# Patient Record
Sex: MALE | Race: WHITE | HISPANIC OR LATINO | Employment: FULL TIME | URBAN - METROPOLITAN AREA
[De-identification: names, ages, dates, MRNs, and addresses within clinical notes are randomized per-mention and may not be internally consistent; named-entity substitution may affect disease eponyms.]

---

## 2022-06-15 ENCOUNTER — APPOINTMENT (OUTPATIENT)
Dept: RADIOLOGY | Facility: MEDICAL CENTER | Age: 62
End: 2022-06-15
Attending: PSYCHIATRY & NEUROLOGY
Payer: COMMERCIAL

## 2022-06-15 ENCOUNTER — APPOINTMENT (OUTPATIENT)
Dept: RADIOLOGY | Facility: MEDICAL CENTER | Age: 62
End: 2022-06-15
Attending: EMERGENCY MEDICINE
Payer: COMMERCIAL

## 2022-06-15 ENCOUNTER — APPOINTMENT (OUTPATIENT)
Dept: RADIOLOGY | Facility: MEDICAL CENTER | Age: 62
End: 2022-06-15
Attending: STUDENT IN AN ORGANIZED HEALTH CARE EDUCATION/TRAINING PROGRAM
Payer: COMMERCIAL

## 2022-06-15 ENCOUNTER — HOSPITAL ENCOUNTER (OUTPATIENT)
Facility: MEDICAL CENTER | Age: 62
End: 2022-06-17
Attending: EMERGENCY MEDICINE | Admitting: STUDENT IN AN ORGANIZED HEALTH CARE EDUCATION/TRAINING PROGRAM
Payer: COMMERCIAL

## 2022-06-15 DIAGNOSIS — R29.810 FACIAL WEAKNESS: ICD-10-CM

## 2022-06-15 DIAGNOSIS — E87.6 HYPOKALEMIA: ICD-10-CM

## 2022-06-15 DIAGNOSIS — I10 HYPERTENSION, UNSPECIFIED TYPE: ICD-10-CM

## 2022-06-15 DIAGNOSIS — R03.0 ELEVATED BLOOD PRESSURE READING: ICD-10-CM

## 2022-06-15 DIAGNOSIS — I63.81 BASAL GANGLIA STROKE (HCC): ICD-10-CM

## 2022-06-15 DIAGNOSIS — R47.81 SLURRED SPEECH: ICD-10-CM

## 2022-06-15 PROBLEM — I16.0 HYPERTENSIVE URGENCY: Status: ACTIVE | Noted: 2022-06-15

## 2022-06-15 PROBLEM — R79.89 ELEVATED LFTS: Status: ACTIVE | Noted: 2022-06-15

## 2022-06-15 PROBLEM — R73.9 HYPERGLYCEMIA: Status: ACTIVE | Noted: 2022-06-15

## 2022-06-15 PROBLEM — N17.9 AKI (ACUTE KIDNEY INJURY) (HCC): Status: ACTIVE | Noted: 2022-06-15

## 2022-06-15 LAB
ABO + RH BLD: NORMAL
ABO GROUP BLD: NORMAL
ALBUMIN SERPL BCP-MCNC: 4.5 G/DL (ref 3.2–4.9)
ALBUMIN/GLOB SERPL: 1.5 G/DL
ALP SERPL-CCNC: 98 U/L (ref 30–99)
ALT SERPL-CCNC: 51 U/L (ref 2–50)
AMPHET UR QL SCN: NEGATIVE
ANION GAP SERPL CALC-SCNC: 15 MMOL/L (ref 7–16)
APTT PPP: 25.6 SEC (ref 24.7–36)
AST SERPL-CCNC: 49 U/L (ref 12–45)
BARBITURATES UR QL SCN: NEGATIVE
BASOPHILS # BLD AUTO: 1.4 % (ref 0–1.8)
BASOPHILS # BLD: 0.1 K/UL (ref 0–0.12)
BENZODIAZ UR QL SCN: NEGATIVE
BILIRUB SERPL-MCNC: 1.5 MG/DL (ref 0.1–1.5)
BLD GP AB SCN SERPL QL: NORMAL
BUN SERPL-MCNC: 12 MG/DL (ref 8–22)
BZE UR QL SCN: NEGATIVE
CALCIUM SERPL-MCNC: 9.5 MG/DL (ref 8.5–10.5)
CANNABINOIDS UR QL SCN: NEGATIVE
CHLORIDE SERPL-SCNC: 107 MMOL/L (ref 96–112)
CO2 SERPL-SCNC: 22 MMOL/L (ref 20–33)
CREAT SERPL-MCNC: 1.33 MG/DL (ref 0.5–1.4)
EOSINOPHIL # BLD AUTO: 0.13 K/UL (ref 0–0.51)
EOSINOPHIL NFR BLD: 1.8 % (ref 0–6.9)
ERYTHROCYTE [DISTWIDTH] IN BLOOD BY AUTOMATED COUNT: 38.4 FL (ref 35.9–50)
EST. AVERAGE GLUCOSE BLD GHB EST-MCNC: 111 MG/DL
ETHANOL BLD-MCNC: <10.1 MG/DL
GFR SERPLBLD CREATININE-BSD FMLA CKD-EPI: 61 ML/MIN/1.73 M 2
GLOBULIN SER CALC-MCNC: 3 G/DL (ref 1.9–3.5)
GLUCOSE BLD STRIP.AUTO-MCNC: 125 MG/DL (ref 65–99)
GLUCOSE SERPL-MCNC: 159 MG/DL (ref 65–99)
HBA1C MFR BLD: 5.5 % (ref 4–5.6)
HCT VFR BLD AUTO: 48.3 % (ref 42–52)
HGB BLD-MCNC: 17.9 G/DL (ref 14–18)
IMM GRANULOCYTES # BLD AUTO: 0.03 K/UL (ref 0–0.11)
IMM GRANULOCYTES NFR BLD AUTO: 0.4 % (ref 0–0.9)
INR PPP: 1.07 (ref 0.87–1.13)
LYMPHOCYTES # BLD AUTO: 1.78 K/UL (ref 1–4.8)
LYMPHOCYTES NFR BLD: 24.4 % (ref 22–41)
MCH RBC QN AUTO: 31.2 PG (ref 27–33)
MCHC RBC AUTO-ENTMCNC: 37.1 G/DL (ref 33.7–35.3)
MCV RBC AUTO: 84.3 FL (ref 81.4–97.8)
METHADONE UR QL SCN: NEGATIVE
MONOCYTES # BLD AUTO: 0.75 K/UL (ref 0–0.85)
MONOCYTES NFR BLD AUTO: 10.3 % (ref 0–13.4)
NEUTROPHILS # BLD AUTO: 4.52 K/UL (ref 1.82–7.42)
NEUTROPHILS NFR BLD: 61.7 % (ref 44–72)
NRBC # BLD AUTO: 0 K/UL
NRBC BLD-RTO: 0 /100 WBC
OPIATES UR QL SCN: NEGATIVE
OXYCODONE UR QL SCN: NEGATIVE
PCP UR QL SCN: NEGATIVE
PLATELET # BLD AUTO: 75 K/UL (ref 164–446)
PMV BLD AUTO: 12.2 FL (ref 9–12.9)
POTASSIUM SERPL-SCNC: 3.3 MMOL/L (ref 3.6–5.5)
PROPOXYPH UR QL SCN: NEGATIVE
PROT SERPL-MCNC: 7.5 G/DL (ref 6–8.2)
PROTHROMBIN TIME: 13.6 SEC (ref 12–14.6)
RBC # BLD AUTO: 5.73 M/UL (ref 4.7–6.1)
RH BLD: NORMAL
SODIUM SERPL-SCNC: 144 MMOL/L (ref 135–145)
TROPONIN T SERPL-MCNC: 15 NG/L (ref 6–19)
WBC # BLD AUTO: 7.3 K/UL (ref 4.8–10.8)

## 2022-06-15 PROCEDURE — 80307 DRUG TEST PRSMV CHEM ANLYZR: CPT

## 2022-06-15 PROCEDURE — 86901 BLOOD TYPING SEROLOGIC RH(D): CPT

## 2022-06-15 PROCEDURE — 83036 HEMOGLOBIN GLYCOSYLATED A1C: CPT

## 2022-06-15 PROCEDURE — 76700 US EXAM ABDOM COMPLETE: CPT

## 2022-06-15 PROCEDURE — 70496 CT ANGIOGRAPHY HEAD: CPT

## 2022-06-15 PROCEDURE — G0378 HOSPITAL OBSERVATION PER HR: HCPCS

## 2022-06-15 PROCEDURE — A9576 INJ PROHANCE MULTIPACK: HCPCS | Performed by: PSYCHIATRY & NEUROLOGY

## 2022-06-15 PROCEDURE — 700111 HCHG RX REV CODE 636 W/ 250 OVERRIDE (IP): Performed by: EMERGENCY MEDICINE

## 2022-06-15 PROCEDURE — A9270 NON-COVERED ITEM OR SERVICE: HCPCS | Performed by: STUDENT IN AN ORGANIZED HEALTH CARE EDUCATION/TRAINING PROGRAM

## 2022-06-15 PROCEDURE — 86850 RBC ANTIBODY SCREEN: CPT

## 2022-06-15 PROCEDURE — 86900 BLOOD TYPING SEROLOGIC ABO: CPT

## 2022-06-15 PROCEDURE — 700111 HCHG RX REV CODE 636 W/ 250 OVERRIDE (IP): Performed by: STUDENT IN AN ORGANIZED HEALTH CARE EDUCATION/TRAINING PROGRAM

## 2022-06-15 PROCEDURE — 96374 THER/PROPH/DIAG INJ IV PUSH: CPT

## 2022-06-15 PROCEDURE — 96376 TX/PRO/DX INJ SAME DRUG ADON: CPT

## 2022-06-15 PROCEDURE — 700102 HCHG RX REV CODE 250 W/ 637 OVERRIDE(OP): Performed by: STUDENT IN AN ORGANIZED HEALTH CARE EDUCATION/TRAINING PROGRAM

## 2022-06-15 PROCEDURE — 99285 EMERGENCY DEPT VISIT HI MDM: CPT

## 2022-06-15 PROCEDURE — 93005 ELECTROCARDIOGRAM TRACING: CPT | Performed by: EMERGENCY MEDICINE

## 2022-06-15 PROCEDURE — 80053 COMPREHEN METABOLIC PANEL: CPT

## 2022-06-15 PROCEDURE — 99218 PR INITIAL OBSERVATION CARE,LEVL I: CPT | Performed by: STUDENT IN AN ORGANIZED HEALTH CARE EDUCATION/TRAINING PROGRAM

## 2022-06-15 PROCEDURE — 70450 CT HEAD/BRAIN W/O DYE: CPT

## 2022-06-15 PROCEDURE — 700101 HCHG RX REV CODE 250: Performed by: STUDENT IN AN ORGANIZED HEALTH CARE EDUCATION/TRAINING PROGRAM

## 2022-06-15 PROCEDURE — 96375 TX/PRO/DX INJ NEW DRUG ADDON: CPT

## 2022-06-15 PROCEDURE — 82962 GLUCOSE BLOOD TEST: CPT

## 2022-06-15 PROCEDURE — 36415 COLL VENOUS BLD VENIPUNCTURE: CPT

## 2022-06-15 PROCEDURE — 700117 HCHG RX CONTRAST REV CODE 255: Performed by: PSYCHIATRY & NEUROLOGY

## 2022-06-15 PROCEDURE — 85610 PROTHROMBIN TIME: CPT

## 2022-06-15 PROCEDURE — 85730 THROMBOPLASTIN TIME PARTIAL: CPT

## 2022-06-15 PROCEDURE — 94760 N-INVAS EAR/PLS OXIMETRY 1: CPT

## 2022-06-15 PROCEDURE — 700117 HCHG RX CONTRAST REV CODE 255: Performed by: EMERGENCY MEDICINE

## 2022-06-15 PROCEDURE — 84484 ASSAY OF TROPONIN QUANT: CPT

## 2022-06-15 PROCEDURE — 85025 COMPLETE CBC W/AUTO DIFF WBC: CPT

## 2022-06-15 PROCEDURE — 70553 MRI BRAIN STEM W/O & W/DYE: CPT

## 2022-06-15 PROCEDURE — 99204 OFFICE O/P NEW MOD 45 MIN: CPT | Performed by: PSYCHIATRY & NEUROLOGY

## 2022-06-15 PROCEDURE — 70498 CT ANGIOGRAPHY NECK: CPT

## 2022-06-15 PROCEDURE — 0042T CT-CEREBRAL PERFUSION ANALYSIS: CPT

## 2022-06-15 PROCEDURE — 71045 X-RAY EXAM CHEST 1 VIEW: CPT

## 2022-06-15 PROCEDURE — 82077 ASSAY SPEC XCP UR&BREATH IA: CPT

## 2022-06-15 RX ORDER — LABETALOL HYDROCHLORIDE 5 MG/ML
10 INJECTION, SOLUTION INTRAVENOUS ONCE
Status: COMPLETED | OUTPATIENT
Start: 2022-06-15 | End: 2022-06-15

## 2022-06-15 RX ORDER — SODIUM CHLORIDE AND POTASSIUM CHLORIDE 150; 900 MG/100ML; MG/100ML
INJECTION, SOLUTION INTRAVENOUS CONTINUOUS
Status: DISCONTINUED | OUTPATIENT
Start: 2022-06-15 | End: 2022-06-16

## 2022-06-15 RX ORDER — ASPIRIN 300 MG/1
300 SUPPOSITORY RECTAL EVERY EVENING
Status: DISCONTINUED | OUTPATIENT
Start: 2022-06-15 | End: 2022-06-16

## 2022-06-15 RX ORDER — PROCHLORPERAZINE EDISYLATE 5 MG/ML
5-10 INJECTION INTRAMUSCULAR; INTRAVENOUS EVERY 4 HOURS PRN
Status: DISCONTINUED | OUTPATIENT
Start: 2022-06-15 | End: 2022-06-17 | Stop reason: HOSPADM

## 2022-06-15 RX ORDER — HYDRALAZINE HYDROCHLORIDE 50 MG/1
50 TABLET, FILM COATED ORAL EVERY 8 HOURS
Status: DISCONTINUED | OUTPATIENT
Start: 2022-06-15 | End: 2022-06-17 | Stop reason: HOSPADM

## 2022-06-15 RX ORDER — SODIUM CHLORIDE AND POTASSIUM CHLORIDE 150; 900 MG/100ML; MG/100ML
INJECTION, SOLUTION INTRAVENOUS CONTINUOUS
Status: DISCONTINUED | OUTPATIENT
Start: 2022-06-15 | End: 2022-06-15

## 2022-06-15 RX ORDER — AMLODIPINE BESYLATE 10 MG/1
10 TABLET ORAL
Status: DISCONTINUED | OUTPATIENT
Start: 2022-06-15 | End: 2022-06-17 | Stop reason: HOSPADM

## 2022-06-15 RX ORDER — DEXTROSE MONOHYDRATE 25 G/50ML
25 INJECTION, SOLUTION INTRAVENOUS
Status: DISCONTINUED | OUTPATIENT
Start: 2022-06-15 | End: 2022-06-16

## 2022-06-15 RX ORDER — PROMETHAZINE HYDROCHLORIDE 25 MG/1
12.5-25 SUPPOSITORY RECTAL EVERY 4 HOURS PRN
Status: DISCONTINUED | OUTPATIENT
Start: 2022-06-15 | End: 2022-06-17 | Stop reason: HOSPADM

## 2022-06-15 RX ORDER — ONDANSETRON 2 MG/ML
4 INJECTION INTRAMUSCULAR; INTRAVENOUS EVERY 4 HOURS PRN
Status: DISCONTINUED | OUTPATIENT
Start: 2022-06-15 | End: 2022-06-16

## 2022-06-15 RX ORDER — ASPIRIN 325 MG
325 TABLET ORAL EVERY EVENING
Status: DISCONTINUED | OUTPATIENT
Start: 2022-06-15 | End: 2022-06-16

## 2022-06-15 RX ORDER — AMOXICILLIN 250 MG
2 CAPSULE ORAL 2 TIMES DAILY
Status: DISCONTINUED | OUTPATIENT
Start: 2022-06-16 | End: 2022-06-17 | Stop reason: HOSPADM

## 2022-06-15 RX ORDER — ATORVASTATIN CALCIUM 80 MG/1
80 TABLET, FILM COATED ORAL EVERY EVENING
Status: DISCONTINUED | OUTPATIENT
Start: 2022-06-15 | End: 2022-06-17 | Stop reason: HOSPADM

## 2022-06-15 RX ORDER — ONDANSETRON 4 MG/1
4 TABLET, ORALLY DISINTEGRATING ORAL EVERY 4 HOURS PRN
Status: DISCONTINUED | OUTPATIENT
Start: 2022-06-15 | End: 2022-06-16

## 2022-06-15 RX ORDER — PROMETHAZINE HYDROCHLORIDE 25 MG/1
12.5-25 TABLET ORAL EVERY 4 HOURS PRN
Status: DISCONTINUED | OUTPATIENT
Start: 2022-06-15 | End: 2022-06-17 | Stop reason: HOSPADM

## 2022-06-15 RX ORDER — BISACODYL 10 MG
10 SUPPOSITORY, RECTAL RECTAL
Status: DISCONTINUED | OUTPATIENT
Start: 2022-06-15 | End: 2022-06-17 | Stop reason: HOSPADM

## 2022-06-15 RX ORDER — POTASSIUM CHLORIDE 20 MEQ/1
40 TABLET, EXTENDED RELEASE ORAL ONCE
Status: ACTIVE | OUTPATIENT
Start: 2022-06-15 | End: 2022-06-16

## 2022-06-15 RX ORDER — ACETAMINOPHEN 325 MG/1
650 TABLET ORAL EVERY 6 HOURS PRN
Status: DISCONTINUED | OUTPATIENT
Start: 2022-06-15 | End: 2022-06-17 | Stop reason: HOSPADM

## 2022-06-15 RX ORDER — ASPIRIN 81 MG/1
324 TABLET, CHEWABLE ORAL EVERY EVENING
Status: DISCONTINUED | OUTPATIENT
Start: 2022-06-15 | End: 2022-06-16

## 2022-06-15 RX ORDER — HYDRALAZINE HYDROCHLORIDE 20 MG/ML
10 INJECTION INTRAMUSCULAR; INTRAVENOUS EVERY 4 HOURS PRN
Status: DISCONTINUED | OUTPATIENT
Start: 2022-06-15 | End: 2022-06-17 | Stop reason: HOSPADM

## 2022-06-15 RX ORDER — POLYETHYLENE GLYCOL 3350 17 G/17G
1 POWDER, FOR SOLUTION ORAL
Status: DISCONTINUED | OUTPATIENT
Start: 2022-06-15 | End: 2022-06-17 | Stop reason: HOSPADM

## 2022-06-15 RX ORDER — HYDRALAZINE HYDROCHLORIDE 20 MG/ML
20 INJECTION INTRAMUSCULAR; INTRAVENOUS ONCE
Status: COMPLETED | OUTPATIENT
Start: 2022-06-15 | End: 2022-06-15

## 2022-06-15 RX ADMIN — IOPAMIDOL 80 ML: 755 INJECTION, SOLUTION INTRAVENOUS at 17:23

## 2022-06-15 RX ADMIN — IOHEXOL 40 ML: 350 INJECTION, SOLUTION INTRAVENOUS at 17:45

## 2022-06-15 RX ADMIN — HYDRALAZINE HYDROCHLORIDE 20 MG: 20 INJECTION INTRAMUSCULAR; INTRAVENOUS at 17:56

## 2022-06-15 RX ADMIN — GADOTERIDOL 15 ML: 279.3 INJECTION, SOLUTION INTRAVENOUS at 22:01

## 2022-06-15 RX ADMIN — POTASSIUM CHLORIDE AND SODIUM CHLORIDE: 900; 150 INJECTION, SOLUTION INTRAVENOUS at 19:45

## 2022-06-15 RX ADMIN — LABETALOL HYDROCHLORIDE 10 MG: 5 INJECTION, SOLUTION INTRAVENOUS at 20:44

## 2022-06-15 RX ADMIN — ASPIRIN 300 MG: 300 SUPPOSITORY RECTAL at 20:47

## 2022-06-15 RX ADMIN — HYDRALAZINE HYDROCHLORIDE 10 MG: 20 INJECTION INTRAMUSCULAR; INTRAVENOUS at 23:06

## 2022-06-15 ASSESSMENT — ENCOUNTER SYMPTOMS
TREMORS: 0
DOUBLE VISION: 0
ABDOMINAL PAIN: 0
WHEEZING: 0
LOSS OF CONSCIOUSNESS: 0
TINGLING: 0
SPEECH CHANGE: 1
VOMITING: 0
WEAKNESS: 0
BLURRED VISION: 0
FOCAL WEAKNESS: 0
BRUISES/BLEEDS EASILY: 0
PALPITATIONS: 0
NAUSEA: 0
HEARTBURN: 0
PND: 0
HEADACHES: 0
SENSORY CHANGE: 0
FALLS: 0
COUGH: 0
CHILLS: 0
MYALGIAS: 0
DEPRESSION: 0
SEIZURES: 0
FEVER: 0
DIZZINESS: 0

## 2022-06-15 ASSESSMENT — LIFESTYLE VARIABLES
DOES PATIENT WANT TO STOP DRINKING: NO
TOTAL SCORE: 0
CONSUMPTION TOTAL: NEGATIVE
TOTAL SCORE: 0
EVER FELT BAD OR GUILTY ABOUT YOUR DRINKING: NO
EVER HAD A DRINK FIRST THING IN THE MORNING TO STEADY YOUR NERVES TO GET RID OF A HANGOVER: NO
SUBSTANCE_ABUSE: 0
HAVE YOU EVER FELT YOU SHOULD CUT DOWN ON YOUR DRINKING: NO
HOW MANY TIMES IN THE PAST YEAR HAVE YOU HAD 5 OR MORE DRINKS IN A DAY: 0
AVERAGE NUMBER OF DAYS PER WEEK YOU HAVE A DRINK CONTAINING ALCOHOL: 0
HAVE PEOPLE ANNOYED YOU BY CRITICIZING YOUR DRINKING: NO
ALCOHOL_USE: NO
TOTAL SCORE: 0
ON A TYPICAL DAY WHEN YOU DRINK ALCOHOL HOW MANY DRINKS DO YOU HAVE: 0

## 2022-06-15 ASSESSMENT — COGNITIVE AND FUNCTIONAL STATUS - GENERAL
MOVING FROM LYING ON BACK TO SITTING ON SIDE OF FLAT BED: A LITTLE
DAILY ACTIVITIY SCORE: 24
SUGGESTED CMS G CODE MODIFIER DAILY ACTIVITY: CH
MOBILITY SCORE: 20
CLIMB 3 TO 5 STEPS WITH RAILING: A LITTLE
SUGGESTED CMS G CODE MODIFIER MOBILITY: CJ
STANDING UP FROM CHAIR USING ARMS: A LITTLE
WALKING IN HOSPITAL ROOM: A LITTLE

## 2022-06-15 ASSESSMENT — PAIN DESCRIPTION - PAIN TYPE: TYPE: ACUTE PAIN

## 2022-06-15 ASSESSMENT — PATIENT HEALTH QUESTIONNAIRE - PHQ9
1. LITTLE INTEREST OR PLEASURE IN DOING THINGS: NOT AT ALL
SUM OF ALL RESPONSES TO PHQ9 QUESTIONS 1 AND 2: 0
2. FEELING DOWN, DEPRESSED, IRRITABLE, OR HOPELESS: NOT AT ALL

## 2022-06-15 ASSESSMENT — FIBROSIS 4 INDEX: FIB4 SCORE: 5.58

## 2022-06-16 PROBLEM — R94.31 QT PROLONGATION: Status: ACTIVE | Noted: 2022-06-16

## 2022-06-16 PROBLEM — I63.81 BASAL GANGLIA STROKE (HCC): Status: ACTIVE | Noted: 2022-06-16

## 2022-06-16 LAB
ALBUMIN SERPL BCP-MCNC: 4.1 G/DL (ref 3.2–4.9)
ALBUMIN/GLOB SERPL: 1.5 G/DL
ALP SERPL-CCNC: 90 U/L (ref 30–99)
ALT SERPL-CCNC: 47 U/L (ref 2–50)
ANION GAP SERPL CALC-SCNC: 13 MMOL/L (ref 7–16)
AST SERPL-CCNC: 36 U/L (ref 12–45)
BASOPHILS # BLD AUTO: 1.1 % (ref 0–1.8)
BASOPHILS # BLD: 0.08 K/UL (ref 0–0.12)
BILIRUB SERPL-MCNC: 1.4 MG/DL (ref 0.1–1.5)
BUN SERPL-MCNC: 12 MG/DL (ref 8–22)
CALCIUM SERPL-MCNC: 8.7 MG/DL (ref 8.5–10.5)
CHLORIDE SERPL-SCNC: 110 MMOL/L (ref 96–112)
CHOLEST SERPL-MCNC: 176 MG/DL (ref 100–199)
CO2 SERPL-SCNC: 21 MMOL/L (ref 20–33)
CREAT SERPL-MCNC: 1.2 MG/DL (ref 0.5–1.4)
CREAT UR-MCNC: 117.75 MG/DL
EKG IMPRESSION: NORMAL
EOSINOPHIL # BLD AUTO: 0.05 K/UL (ref 0–0.51)
EOSINOPHIL NFR BLD: 0.7 % (ref 0–6.9)
ERYTHROCYTE [DISTWIDTH] IN BLOOD BY AUTOMATED COUNT: 39.7 FL (ref 35.9–50)
GFR SERPLBLD CREATININE-BSD FMLA CKD-EPI: 69 ML/MIN/1.73 M 2
GLOBULIN SER CALC-MCNC: 2.8 G/DL (ref 1.9–3.5)
GLUCOSE BLD STRIP.AUTO-MCNC: 114 MG/DL (ref 65–99)
GLUCOSE BLD STRIP.AUTO-MCNC: 115 MG/DL (ref 65–99)
GLUCOSE BLD STRIP.AUTO-MCNC: 118 MG/DL (ref 65–99)
GLUCOSE BLD STRIP.AUTO-MCNC: 120 MG/DL (ref 65–99)
GLUCOSE SERPL-MCNC: 143 MG/DL (ref 65–99)
HCT VFR BLD AUTO: 46.7 % (ref 42–52)
HDLC SERPL-MCNC: 36 MG/DL
HGB BLD-MCNC: 16.9 G/DL (ref 14–18)
IMM GRANULOCYTES # BLD AUTO: 0.05 K/UL (ref 0–0.11)
IMM GRANULOCYTES NFR BLD AUTO: 0.7 % (ref 0–0.9)
LDLC SERPL CALC-MCNC: 119 MG/DL
LYMPHOCYTES # BLD AUTO: 1.41 K/UL (ref 1–4.8)
LYMPHOCYTES NFR BLD: 19.1 % (ref 22–41)
MAGNESIUM SERPL-MCNC: 2.1 MG/DL (ref 1.5–2.5)
MCH RBC QN AUTO: 30.5 PG (ref 27–33)
MCHC RBC AUTO-ENTMCNC: 36.2 G/DL (ref 33.7–35.3)
MCV RBC AUTO: 84.1 FL (ref 81.4–97.8)
MONOCYTES # BLD AUTO: 0.64 K/UL (ref 0–0.85)
MONOCYTES NFR BLD AUTO: 8.7 % (ref 0–13.4)
NEUTROPHILS # BLD AUTO: 5.14 K/UL (ref 1.82–7.42)
NEUTROPHILS NFR BLD: 69.7 % (ref 44–72)
NRBC # BLD AUTO: 0 K/UL
NRBC BLD-RTO: 0 /100 WBC
PHOSPHATE SERPL-MCNC: 3 MG/DL (ref 2.5–4.5)
PLATELET # BLD AUTO: 72 K/UL (ref 164–446)
PMV BLD AUTO: 12.7 FL (ref 9–12.9)
POTASSIUM SERPL-SCNC: 3.1 MMOL/L (ref 3.6–5.5)
PROT SERPL-MCNC: 6.9 G/DL (ref 6–8.2)
RBC # BLD AUTO: 5.55 M/UL (ref 4.7–6.1)
SODIUM SERPL-SCNC: 144 MMOL/L (ref 135–145)
SODIUM UR-SCNC: 122 MMOL/L
TRIGL SERPL-MCNC: 107 MG/DL (ref 0–149)
WBC # BLD AUTO: 7.4 K/UL (ref 4.8–10.8)

## 2022-06-16 PROCEDURE — 97162 PT EVAL MOD COMPLEX 30 MIN: CPT

## 2022-06-16 PROCEDURE — 82962 GLUCOSE BLOOD TEST: CPT | Mod: 91

## 2022-06-16 PROCEDURE — 80053 COMPREHEN METABOLIC PANEL: CPT

## 2022-06-16 PROCEDURE — 82570 ASSAY OF URINE CREATININE: CPT

## 2022-06-16 PROCEDURE — A9270 NON-COVERED ITEM OR SERVICE: HCPCS | Performed by: PSYCHIATRY & NEUROLOGY

## 2022-06-16 PROCEDURE — 96375 TX/PRO/DX INJ NEW DRUG ADDON: CPT

## 2022-06-16 PROCEDURE — 36415 COLL VENOUS BLD VENIPUNCTURE: CPT

## 2022-06-16 PROCEDURE — 97165 OT EVAL LOW COMPLEX 30 MIN: CPT

## 2022-06-16 PROCEDURE — 99226 PR SUBSEQUENT OBSERVATION CARE,LEVEL III: CPT | Performed by: STUDENT IN AN ORGANIZED HEALTH CARE EDUCATION/TRAINING PROGRAM

## 2022-06-16 PROCEDURE — 80061 LIPID PANEL: CPT

## 2022-06-16 PROCEDURE — 96372 THER/PROPH/DIAG INJ SC/IM: CPT

## 2022-06-16 PROCEDURE — 99214 OFFICE O/P EST MOD 30 MIN: CPT | Performed by: PSYCHIATRY & NEUROLOGY

## 2022-06-16 PROCEDURE — 700111 HCHG RX REV CODE 636 W/ 250 OVERRIDE (IP)

## 2022-06-16 PROCEDURE — 96366 THER/PROPH/DIAG IV INF ADDON: CPT

## 2022-06-16 PROCEDURE — A9270 NON-COVERED ITEM OR SERVICE: HCPCS | Performed by: STUDENT IN AN ORGANIZED HEALTH CARE EDUCATION/TRAINING PROGRAM

## 2022-06-16 PROCEDURE — 700102 HCHG RX REV CODE 250 W/ 637 OVERRIDE(OP): Performed by: PSYCHIATRY & NEUROLOGY

## 2022-06-16 PROCEDURE — 83735 ASSAY OF MAGNESIUM: CPT

## 2022-06-16 PROCEDURE — 700105 HCHG RX REV CODE 258: Performed by: STUDENT IN AN ORGANIZED HEALTH CARE EDUCATION/TRAINING PROGRAM

## 2022-06-16 PROCEDURE — 85025 COMPLETE CBC W/AUTO DIFF WBC: CPT

## 2022-06-16 PROCEDURE — 700111 HCHG RX REV CODE 636 W/ 250 OVERRIDE (IP): Performed by: STUDENT IN AN ORGANIZED HEALTH CARE EDUCATION/TRAINING PROGRAM

## 2022-06-16 PROCEDURE — 92610 EVALUATE SWALLOWING FUNCTION: CPT

## 2022-06-16 PROCEDURE — 700102 HCHG RX REV CODE 250 W/ 637 OVERRIDE(OP): Performed by: STUDENT IN AN ORGANIZED HEALTH CARE EDUCATION/TRAINING PROGRAM

## 2022-06-16 PROCEDURE — 84300 ASSAY OF URINE SODIUM: CPT

## 2022-06-16 PROCEDURE — 84100 ASSAY OF PHOSPHORUS: CPT

## 2022-06-16 PROCEDURE — G0378 HOSPITAL OBSERVATION PER HR: HCPCS

## 2022-06-16 PROCEDURE — 96365 THER/PROPH/DIAG IV INF INIT: CPT

## 2022-06-16 PROCEDURE — 97116 GAIT TRAINING THERAPY: CPT

## 2022-06-16 RX ORDER — DEXTROSE MONOHYDRATE 25 G/50ML
25 INJECTION, SOLUTION INTRAVENOUS
Status: DISCONTINUED | OUTPATIENT
Start: 2022-06-16 | End: 2022-06-17 | Stop reason: HOSPADM

## 2022-06-16 RX ORDER — KETOROLAC TROMETHAMINE 30 MG/ML
15 INJECTION, SOLUTION INTRAMUSCULAR; INTRAVENOUS EVERY 6 HOURS PRN
Status: DISCONTINUED | OUTPATIENT
Start: 2022-06-16 | End: 2022-06-17 | Stop reason: HOSPADM

## 2022-06-16 RX ORDER — CLOPIDOGREL BISULFATE 75 MG/1
75 TABLET ORAL DAILY
Status: DISCONTINUED | OUTPATIENT
Start: 2022-06-16 | End: 2022-06-17 | Stop reason: HOSPADM

## 2022-06-16 RX ORDER — POTASSIUM CHLORIDE 7.45 MG/ML
10 INJECTION INTRAVENOUS
Status: DISPENSED | OUTPATIENT
Start: 2022-06-16 | End: 2022-06-16

## 2022-06-16 RX ORDER — SODIUM CHLORIDE 9 MG/ML
INJECTION, SOLUTION INTRAVENOUS CONTINUOUS
Status: DISCONTINUED | OUTPATIENT
Start: 2022-06-16 | End: 2022-06-16

## 2022-06-16 RX ORDER — ENOXAPARIN SODIUM 100 MG/ML
40 INJECTION SUBCUTANEOUS DAILY
Status: DISCONTINUED | OUTPATIENT
Start: 2022-06-16 | End: 2022-06-17 | Stop reason: HOSPADM

## 2022-06-16 RX ORDER — POTASSIUM CHLORIDE 7.45 MG/ML
10 INJECTION INTRAVENOUS
Status: COMPLETED | OUTPATIENT
Start: 2022-06-16 | End: 2022-06-16

## 2022-06-16 RX ORDER — ASPIRIN 81 MG/1
81 TABLET, CHEWABLE ORAL DAILY
Status: DISCONTINUED | OUTPATIENT
Start: 2022-06-16 | End: 2022-06-17 | Stop reason: HOSPADM

## 2022-06-16 RX ADMIN — POTASSIUM CHLORIDE 10 MEQ: 7.46 INJECTION, SOLUTION INTRAVENOUS at 12:01

## 2022-06-16 RX ADMIN — SODIUM CHLORIDE: 9 INJECTION, SOLUTION INTRAVENOUS at 10:30

## 2022-06-16 RX ADMIN — HYDRALAZINE HYDROCHLORIDE 50 MG: 50 TABLET, FILM COATED ORAL at 21:10

## 2022-06-16 RX ADMIN — ATORVASTATIN CALCIUM 80 MG: 80 TABLET, FILM COATED ORAL at 18:11

## 2022-06-16 RX ADMIN — KETOROLAC TROMETHAMINE 15 MG: 30 INJECTION, SOLUTION INTRAMUSCULAR at 02:06

## 2022-06-16 RX ADMIN — POTASSIUM CHLORIDE 10 MEQ: 7.46 INJECTION, SOLUTION INTRAVENOUS at 12:54

## 2022-06-16 RX ADMIN — POTASSIUM CHLORIDE 10 MEQ: 7.46 INJECTION, SOLUTION INTRAVENOUS at 09:04

## 2022-06-16 RX ADMIN — HYDRALAZINE HYDROCHLORIDE 50 MG: 50 TABLET, FILM COATED ORAL at 14:07

## 2022-06-16 RX ADMIN — ENOXAPARIN SODIUM 40 MG: 40 INJECTION SUBCUTANEOUS at 18:11

## 2022-06-16 ASSESSMENT — COGNITIVE AND FUNCTIONAL STATUS - GENERAL
TOILETING: A LITTLE
PERSONAL GROOMING: A LITTLE
MOBILITY SCORE: 24
HELP NEEDED FOR BATHING: A LITTLE
SUGGESTED CMS G CODE MODIFIER MOBILITY: CH
SUGGESTED CMS G CODE MODIFIER DAILY ACTIVITY: CJ
DAILY ACTIVITIY SCORE: 21

## 2022-06-16 ASSESSMENT — ENCOUNTER SYMPTOMS
EYE PAIN: 0
FEVER: 0
HALLUCINATIONS: 0
EYES NEGATIVE: 1
NAUSEA: 0
NECK PAIN: 0
HEADACHES: 0
VOMITING: 0
BACK PAIN: 0
BLOOD IN STOOL: 0
BRUISES/BLEEDS EASILY: 0
CONSTITUTIONAL NEGATIVE: 1
GASTROINTESTINAL NEGATIVE: 1
ABDOMINAL PAIN: 0
FLANK PAIN: 0
DIZZINESS: 0
WEAKNESS: 0
RESPIRATORY NEGATIVE: 1
SEIZURES: 0
FOCAL WEAKNESS: 1
SORE THROAT: 0
SHORTNESS OF BREATH: 0
CARDIOVASCULAR NEGATIVE: 1
BLURRED VISION: 0
COUGH: 0
MYALGIAS: 0

## 2022-06-16 ASSESSMENT — GAIT ASSESSMENTS
GAIT LEVEL OF ASSIST: STANDBY ASSIST
DISTANCE (FEET): 300

## 2022-06-16 ASSESSMENT — ACTIVITIES OF DAILY LIVING (ADL): TOILETING: INDEPENDENT

## 2022-06-16 ASSESSMENT — PAIN DESCRIPTION - PAIN TYPE
TYPE: ACUTE PAIN

## 2022-06-16 ASSESSMENT — FIBROSIS 4 INDEX: FIB4 SCORE: 4.45

## 2022-06-16 NOTE — THERAPY
Speech Language Pathology   Clinical Swallow Evaluation     Patient Name: Ion Sharp  AGE:  62 y.o., SEX:  male  Medical Record #: [id number]  Today's Date: 6/16/2022     Precautions  Precautions: (P) Fall Risk    Assessment    Patient is 62 y.o. male with a diagnosis of CVA at basal ganglia.     PMHx: hypertension    Level of Consciousness: Alert, Awake  Affect/Behavior: Appropriate, Calm, Cooperative  Follows Directives: Yes  Orientation: Oriented x 4  Hearing: Functional hearing  Vision: Functional vision    Prior Living Situation & Level of Function:  Patient with a previous regular texture diet and thin liquids    Oral Mechanism Evaluation  Facial Symmetry: Central R facial droop  Facial Sensation: Equal  Labial Observations: R sided weakness  Lingual Observations: R lingual deviation  Dentition: Good  Comments:    Voice  Quality: WFL  Resonance: WFL  Intensity: Appropriate  Cough: WFL  Comments:    Current Method of Nutrition   Oral diet (Regular/Thin)    Assessment  Positioning: [place]'s (60-90 degrees)  Bolus Administration: SLP  Oxygen Requirements: Room Air  Factor(s) Affecting Performance: None    Swallowing Trials  Thin Liquid (TN0): WFL  Liquidised (LQ3): WFL  Pureed (PU4): WFL  Regular (RG7): WFL    Comments:    Patient with adequate oral acceptance and containment. Adequate bite and mastication of presented bolus. Presumed timely AP transit and timely swallow trigger. No overt s/sx of aspiration across consistencies tested. Patient with dry vocal quality and no s/sx of respiratory distress upon evaluation.      Clinical Impressions    Oropharyngeal dysphagia not suspected based on clinical bedside evaluation. Patient appears appropriate to resume a regular texture diet.      Recommendations  1.  Regular textures with thin liquids   2.  Instrumentation: None indicated at this time  3.  Swallowing Instructions & Precautions:   Supervision: 1:1 feeding with constant supervision  Positioning: Fully  upright and midline during oral intake  Medication: Whole with liquid  Strategies: Small bites/sips  Oral Care: BID     Plan    Recommend Speech Therapy for Evaluation only for the following treatments:  Dysphagia Training.    Discharge Recommendations: Anticipate that the patient will have no further speech therapy needs after discharge from the hospital     Objective       06/16/22 1101   Precautions   Precautions Fall Risk   Vitals   O2 Delivery Device None - Room Air   Pain 0 - 10 Group   Therapist Pain Assessment Post Activity Pain Same as Prior to Activity;Nurse Notified;0   Prior Living Situation   Prior Services Home-Independent   Lives with - Patient's Self Care Capacity Spouse;Adult Children   Prior Level Of Function   Patient's Primary Language English   Anticipated Discharge Needs   Discharge Recommendations Anticipate that the patient will have no further speech therapy needs after discharge from the hospital   Therapy Recommendations Upon DC Not Indicated

## 2022-06-16 NOTE — ED NOTES
Dr. Polk notified about 202/120 blood pressure and pt's failure of bedside swallow eval due to slurred speech.

## 2022-06-16 NOTE — H&P
Hospital Medicine History & Physical Note    Date of Service  6/15/2022    Primary Care Physician  No primary care provider on file.    Consultants  Neurology    Code Status  Full Code    Chief Complaint  Chief Complaint   Patient presents with   • Possible Stroke     Rt sided facial droop, slurred speech       History of Presenting Illness  Ion Sharp is a 62 y.o. male with history of hypertension who presented 6/15/2022 for stroke evaluation.  Patient reported going to work earlier, at 1600 coworker felt that patient had right facial droop and slurring of speech.  Wife was called and patient was subsequently sent to ER for evaluation.  No acute etiology seen on extensive work-up in ER.  Neurology has been consulted.  Admitted to medicine service.    I discussed the plan of care with patient and bedside RN.    Review of Systems  Review of Systems   Constitutional: Negative for chills and fever.   HENT: Negative for hearing loss and tinnitus.    Eyes: Negative for blurred vision and double vision.   Respiratory: Negative for cough and wheezing.    Cardiovascular: Negative for chest pain, palpitations and PND.   Gastrointestinal: Negative for abdominal pain, heartburn, nausea and vomiting.   Genitourinary: Negative for dysuria and urgency.   Musculoskeletal: Negative for falls and myalgias.   Skin: Negative for itching and rash.   Neurological: Positive for speech change. Negative for dizziness, tingling, tremors, sensory change, focal weakness, seizures, loss of consciousness, weakness and headaches.        Right facial droop   Endo/Heme/Allergies: Negative for environmental allergies. Does not bruise/bleed easily.   Psychiatric/Behavioral: Negative for depression and substance abuse.   All other systems reviewed and are negative.      Past Medical History   has a past medical history of Hypertension.    Surgical History    has no past surgical history on file.   Denies [facility]    Family History  family history is  not on file.   Family history reviewed with patient. There is no family history that is pertinent to the chief complaint.   Denies FH of CVA, MI    Social History   reports that he has never smoked. He has never used smokeless tobacco. He reports previous alcohol use. He reports previous drug use.    Allergies  No Known Allergies    Medications  None       Physical Exam  Temp:  [36.6 °C (97.9 °F)] 36.6 °C (97.9 °F)  Pulse:  [97-99] 99  Resp:  [18-20] 18  BP: (193-207)/(118-120) 207/118  SpO2:  [90 %-93 %] 90 %  Blood Pressure: (!) 193/120   Temperature: 36.6 °C (97.9 °F)   Pulse: 97   Respiration: 20   Pulse Oximetry: 93 %       Physical Exam  Vitals and nursing note reviewed.   Constitutional:       Appearance: Normal appearance.   HENT:      Head: Normocephalic and atraumatic.      Nose: Nose normal.      Mouth/Throat:      Mouth: Mucous membranes are moist.      Pharynx: Oropharynx is clear.   Eyes:      General: No scleral icterus.     Extraocular Movements: Extraocular movements intact.   Cardiovascular:      Rate and Rhythm: Normal rate and regular rhythm.      Pulses: Normal pulses.      Heart sounds:     No friction rub.   Pulmonary:      Effort: Pulmonary effort is normal. No respiratory distress.      Breath sounds: No stridor. No wheezing or rales.   Abdominal:      General: Bowel sounds are normal. There is no distension.      Palpations: Abdomen is soft.      Tenderness: There is no abdominal tenderness. There is no right CVA tenderness, guarding or rebound.   Musculoskeletal:         General: Normal range of motion.      Cervical back: Neck supple. No tenderness.   Skin:     General: Skin is warm and dry.      Capillary Refill: Capillary refill takes less than 2 seconds.   Neurological:      Mental Status: He is alert and oriented to person, place, and time.      Sensory: No sensory deficit.      Comments: Right facial droop  Dysarthria, mild   Psychiatric:         Mood and Affect: Mood normal.          Laboratory:  Recent Labs     06/15/22  1704   WBC 7.3   RBC 5.73   HEMOGLOBIN 17.9   HEMATOCRIT 48.3   MCV 84.3   MCH 31.2   MCHC 37.1*   RDW 38.4   PLATELETCT 75*   MPV 12.2     Recent Labs     06/15/22  1704   SODIUM 144   POTASSIUM 3.3*   CHLORIDE 107   CO2 22   GLUCOSE 159*   BUN 12   CREATININE 1.33   CALCIUM 9.5     Recent Labs     06/15/22  1704   ALTSGPT 51*   ASTSGOT 49*   ALKPHOSPHAT 98   TBILIRUBIN 1.5   GLUCOSE 159*     Recent Labs     06/15/22  1704   APTT 25.6   INR 1.07     No results for input(s): NTPROBNP in the last 72 hours.      Recent Labs     06/15/22  1704   TROPONINT 15       Imaging:  DX-CHEST-PORTABLE (1 VIEW)   Final Result      No acute cardiopulmonary abnormality identified.      CT-CTA NECK WITH & W/O-POST PROCESSING   Final Result      CT angiogram of the neck notable for mostly soft plaque and tortuous vessels but no significant stenosis, dissection or occlusion      CT-CEREBRAL PERFUSION ANALYSIS   Final Result      1.  Normal study. Cerebral blood flow less than 30% likely representing completed infarct = 0 mL.      2.  T Max more than 6 seconds likely representing combination of completed infarct and ischemia = 0 mL.      3.  Mismatched volume likely representing ischemic brain/penumbra = None      4.  Please note that the cerebral perfusion was performed on the limited brain tissue around the basal ganglia region. Infarct/ischemia outside the CT perfusion sections can be missed in this study.      CT-CTA HEAD WITH & W/O-POST PROCESS   Final Result      CT angiogram of the [place] within normal limits.      CT-HEAD W/O   Final Result      No acute intracranial abnormality         MR-BRAIN-WITH & W/O    (Results Pending)   MR-BRAIN-W/O    (Results Pending)   EC-ECHOCARDIOGRAM COMPLETE W/O CONT    (Results Pending)       X-Ray:  I have personally reviewed the images and compared with prior images.  EKG:  I have personally reviewed the images and compared with prior  images.    Assessment/Plan:  Justification for Admission Status  I anticipate this patient is appropriate for observation status    * Slurred speech- (present on admission)  Assessment & Plan  ?TIA, r/o CVA  No acute etiology seen on CT head, CTA head neck  ASA/statin  PT/OT/ST  AM lipid panel, HbA1c check  Follow-up echo, MRI brain  Neurology follow-up appreciated    Facial droop  Assessment & Plan  Possible Bell's palsy    CAROL (acute kidney injury) (HCC)  Assessment & Plan  Cr 1.33, unknown baseline  IVF  F/u FeNa, Abd US    Hypertensive urgency  Assessment & Plan  SBP>200 in ED  Amlodipine, hydralazine  PRN hydrazine    Elevated LFTs  Assessment & Plan  Mild, monitor    Hyperglycemia  Assessment & Plan  ISS  F/u HbA1c    Hypokalemia  Assessment & Plan  Replace      VTE prophylaxis: heparin ppx

## 2022-06-16 NOTE — CONSULTS
"Neurology STROKE CODE H&P  Neurohospitalist Service, [facility]    Referring Physician: Gilbert Dean M.D.    STROKE CODE:   Chief Complaint   Patient presents with   • Possible Stroke     Rt sided facial droop, slurred speech       To obtain the most accurate data regarding the time called, and time patient seen, refer to the stroke run-sheet and chart.  For time of CT, refer to the radiology report. See A&P below for TPA Decision and door to needle time if and when applicable.    HPI: 62-year-old male with no past med history.  Says it does not take any medications.  Presenting with right facial weakness.  Patient did not notice any issues.  His coworkers noticed that at 4:00 PM.  His wife noticed the weakness at 2:00 today when she came home.  She last seen in patient normal last night before going to work.  Patient denies any weakness or numbness in extremities.  No headache.  No change in station.  No double vision.  No problems eating or drinking.      Review of systems: In addition to what is detailed in the HPI above, (and scanned into the chart if and when applicable), all other systems reviewed and are negative.    Past Medical History:    has a past medical history of Hypertension.    FHx:  No early stroke  SHx:   reports that he has never smoked. He has never used smokeless tobacco. He reports previous alcohol use. He reports previous drug use.    Allergies:  No Known Allergies    Medications:  No current facility-administered medications for this encounter.  No current outpatient medications on file.    Physical Examination:    Vitals:    06/15/22 1728 06/15/22 1730   BP:  (!) 193/120   Pulse:  97   Resp:  20   Temp:  36.6 °C (97.9 °F)   TempSrc:  Oral   SpO2:  93%   Weight: 77.1 kg (170 lb)    Height: 1.727 m (5' 8\")        General: Patient is awake and in no acute distress  Eyes: Unremarkable  CV: RRR    NEUROLOGICAL EXAM:     Mental status: Awake, alert and fully oriented, " follows commands  Speech and language: speech is slurred but fluent. The patient is able to name and repeat.  Cranial nerve exam: Pupils are equal, round and reactive to light bilaterally. Visual fields are full. Extraocular muscles are intact. Sensation in the face is intact to light touch.  Face appears asymmetrical.  With right-sided weakness.  On neutral position noticed less forehead folding the skin on the right compared to left.  There is a slight delay blinking on the right compared to the left.  Hearing to finger rub equal. Palate elevates symmetrically. Shoulder shrug is full. Tongue is midline.  Motor exam: Strength is 5/5 in all extremities both distally and proximally. Tone is normal. No abnormal movements were seen on exam.  Sensory exam: No sensory deficits identified   Deep tendon reflexes:  2+ and symmetric. Toes down-going bilaterally.  Coordination: no ataxia   Gait: Normal    NIH Stroke Scale:    1a. Level of Consciousness (Alert, drowsy, etc): 0= Alert    1b. LOC Questions (Month, age): 0= Answers both correctly    1c. LOC Commands (Open/close eyes make fist/let go): 0= Obe[place] both correctly    2.   Best Gaze (Eyes open - patient follows examiner's finger on face): 0= Normal    3.   Visual Fields (introduce visual stimulus/threat to patient's field quadrants): 0= No visual loss  4.   Facial Paresis (Show teeth, raise eyebrows and squeeze eyes shut): 2 = Partial     5a. Motor Arm - Left (Elevate arm to 90 degrees if patient is sitting, 45 degrees if  supine): 0= No drift    5b. Motor Arm - Right (Elevate arm to 90 degrees if patient is sitting, 45 degrees if supine): 0= No drift    6a. Motor Leg - Left (Elevate leg 30 degrees with patient supine): 0= No drift    6b. Motor Leg - Right  (Elevate leg 30 degrees with patient supine): 0= No drift    7.   Limb Ataxia (Finger-nose, heel down shin): 0= No ataxia    8.   Sensory (Pin prick to face, arm, trunk and leg - compare side to side): 0=  Normal    9.  Best Language (Name item, describe a picture and read sentences): 0= No aphasia    10. Dysarthria (Evaluate speech clarity by patient repeating listed words): 1= Mild to moderate slurring    11. Extinction and Inattention (Use information from prior testing to identify neglect or  double simultaneous stimuli testing): 0= No neglect    Total NIH Score: 3    Modified Sibley Scale (MRS): 0 = No symptoms      Objective Data:    Labs:  Lab Results   Component Value Date/Time    PROTHROMBTM 13.6 06/15/2022 05:04 PM    INR 1.07 06/15/2022 05:04 PM      Lab Results   Component Value Date/Time    WBC 7.3 06/15/2022 05:04 PM    RBC 5.73 06/15/2022 05:04 PM    HEMOGLOBIN 17.9 06/15/2022 05:04 PM    HEMATOCRIT 48.3 06/15/2022 05:04 PM    MCV 84.3 06/15/2022 05:04 PM    MCH 31.2 06/15/2022 05:04 PM    MCHC 37.1 (H) 06/15/2022 05:04 PM    MPV 12.2 06/15/2022 05:04 PM    NEUTSPOLYS 61.70 06/15/2022 05:04 PM    LYMPHOCYTES 24.40 06/15/2022 05:04 PM    MONOCYTES 10.30 06/15/2022 05:04 PM    EOSINOPHILS 1.80 06/15/2022 05:04 PM    BASOPHILS 1.40 06/15/2022 05:04 PM      Lab Results   Component Value Date/Time    SODIUM 144 06/15/2022 05:04 PM    POTASSIUM 3.3 (L) 06/15/2022 05:04 PM    CHLORIDE 107 06/15/2022 05:04 PM    CO2 22 06/15/2022 05:04 PM    GLUCOSE 159 (H) 06/15/2022 05:04 PM    BUN 12 06/15/2022 05:04 PM    CREATININE 1.33 06/15/2022 05:04 PM      No results found for: CHOLSTRLTOT, LDL, HDL, TRIGLYCERIDE    Lab Results   Component Value Date/Time    ALKPHOSPHAT 98 06/15/2022 05:04 PM    ASTSGOT 49 (H) 06/15/2022 05:04 PM    ALTSGPT 51 (H) 06/15/2022 05:04 PM    TBILIRUBIN 1.5 06/15/2022 05:04 PM        Imaging/Testing:  DX-CHEST-PORTABLE (1 VIEW)   Final Result      No acute cardiopulmonary abnormality identified.      CT-CTA NECK WITH & W/O-POST PROCESSING   Final Result      CT angiogram of the neck notable for mostly soft plaque and tortuous vessels but no significant stenosis, dissection or occlusion       CT-CEREBRAL PERFUSION ANALYSIS   Final Result      1.  Normal study. Cerebral blood flow less than 30% likely representing completed infarct = 0 mL.      2.  T Max more than 6 seconds likely representing combination of completed infarct and ischemia = 0 mL.      3.  Mismatched volume likely representing ischemic brain/penumbra = None      4.  Please note that the cerebral perfusion was performed on the limited brain tissue around the basal ganglia region. Infarct/ischemia outside the CT perfusion sections can be missed in this study.      CT-CTA HEAD WITH & W/O-POST PROCESS   Final Result      CT angiogram of the [place] within normal limits.      CT-HEAD W/O   Final Result      No acute intracranial abnormality         MR-BRAIN-WITH & W/O    (Results Pending)       Assessment and Plan:    62-year-old male presenting with right facial weakness.  On my examination I think this is a partial right-sided Bell's palsy.  He denies any other symptoms.  He is well outside the range for tPA given that his last known well was the night before.  There is no signs of thrombosis on the CTA head and neck.  I will get an MRI brain with/without contrast.  If this is unremarkable for acute infarct he can be discharged home with Bell's palsy treatment.  If it does show a surprising acute infarct we will follow-up with the patient with a complete stroke work-up.    The evaluation of the patient, and recommended management, was discussed with the resident staff.     This chart was partially generated using voice recognition technology and sound alike word replacement may be present, best efforts were made to make the chart accurate.    Trevor George MD  Board Certified Neurology, ABPN  t) [phone]

## 2022-06-16 NOTE — PROGRESS NOTES
Pt refusing K infusion d/t severe burning. TISH baldwin MD re run Y sited to NS at 100 mL/hour. MD endorsed 100 mL hour order during K+ infusion

## 2022-06-16 NOTE — PROGRESS NOTES
4 Eyes Skin Assessment Completed by MAGALIE Ni and MAGALIE Alfaro.    Head WDL  Ears WDL  Nose WDL  Mouth WDL  Neck WDL  Breast/Chest WDL  Shoulder Blades WDL  Spine WDL  (R) Arm/Elbow/Hand WDL  (L) Arm/Elbow/Hand WDL  Abdomen WDL  Groin WDL  Scrotum/Coccyx/Buttocks Redness, blanching  (R) Leg WDL  (L) Leg WDL  (R) Heel/Foot/Toe WDL  (L) Heel/Foot/Toe WDL          Devices In Places Tele Box, Blood Pressure Cuff and Pulse Ox      Interventions In Place Pressure Redistribution Mattress    Possible Skin Injury No    Pictures Uploaded Into Epic N/A  Wound Consult Placed N/A  RN Wound Prevention Protocol Ordered No

## 2022-06-16 NOTE — PROGRESS NOTES
Neurology Progress Note  Neurohospitalist Service, [facility]    Referring Physician: Prashant Bull M.D.      Interval History: No acute events overnight.  Stable exam.  MRI brain with acute L basal ganglia infarct.    Review of systems: In addition to what is detailed in the HPI and/or updated in the interval history, all other systems reviewed and are negative.    Past Medical History, Past Surgical History and Social History reviewed and unchanged from prior    Medications:    Current Facility-Administered Medications:   •  ketorolac (TORADOL) injection 15 mg, 15 mg, Intravenous, Q6HRS PRN, JAVI KimP.R.N., 15 mg at 06/16/22 0206  •  aspirin (ASA) chewable tab 81 mg, 81 mg, Oral, DAILY, Trevor Sam M.D.  •  clopidogrel (PLAVIX) tablet 75 mg, 75 mg, Oral, DAILY, Trevor Sam M.D.  •  enoxaparin (Lovenox) inj 40 mg, 40 mg, Subcutaneous, DAILY AT 1800, Prashant Bull M.D.  •  senna-docusate (PERICOLACE or SENOKOT S) 8.6-50 MG per tablet 2 Tablet, 2 Tablet, Oral, BID **AND** polyethylene glycol/lytes (MIRALAX) PACKET 1 Packet, 1 Packet, Oral, QDAY PRN **AND** magnesium hydroxide (MILK OF MAGNESIA) suspension 30 mL, 30 mL, Oral, QDAY PRN **AND** bisacodyl (DULCOLAX) suppository 10 mg, 10 mg, Rectal, QDAY PRN, Trevor Polk M.D.  •  acetaminophen (Tylenol) tablet 650 mg, 650 mg, Oral, Q6HRS PRN, Trevor Polk M.D.  •  hydrALAZINE (APRESOLINE) injection 10 mg, 10 mg, Intravenous, Q4HRS PRN, Trevor Polk M.D., 10 mg at 06/15/22 2306  •  promethazine (PHENERGAN) tablet 12.5-25 mg, 12.5-25 mg, Oral, Q4HRS PRN, Trevor Polk M.D.  •  promethazine (PHENERGAN) suppository 12.5-25 mg, 12.5-25 mg, Rectal, Q4HRS PRN, Trevor Polk M.D.  •  prochlorperazine (COMPAZINE) injection 5-10 mg, 5-10 mg, Intravenous, Q4HRS PRN, Trevor Polk M.D.  •  atorvastatin (LIPITOR) tablet 80 mg, 80 mg, Oral, Q EVENING, Trevor Polk M.D.  •  insulin regular (HumuLIN R,NovoLIN R) injection, 1-6 Units,  "Subcutaneous, 4X/DAY ACHS **AND** POC blood glucose manual result, , , Q AC AND BEDTIME(S) **AND** NOTIFY MD and PharmD, , , Once **AND** Administer 20 grams of glucose (approximately 8 ounces of fruit juice) every 15 minutes PRN FSBG less than 70 mg/dL, , , PRN **AND** dextrose 50% (D50W) injection 25 g, 25 g, Intravenous, Q15 MIN PRN, Trevor Polk M.D.  •  potassium chloride SA (Kdur) tablet 40 mEq, 40 mEq, Oral, Once, Trevor Polk M.D.  •  amLODIPine (NORVASC) tablet 10 mg, 10 mg, Oral, Q DAY, Trevor Polk M.D.  •  hydrALAZINE (APRESOLINE) tablet 50 mg, 50 mg, Oral, Q8HRS, Trevor Polk M.D., 50 mg at 06/16/22 1407    Physical Examination:   BP (!) 177/112   Pulse 75   Temp 37.3 °C (99.1 °F) (Temporal)   Resp 18   Ht 1.727 m (5' 8\")   Wt 86.5 kg (190 lb 11.2 oz)   SpO2 96%   BMI 29.00 kg/m²       General: Patient is awake and in no acute distress  Neck: There is normal range of motion  CV: Regular rate   Extremities:  Warm, dry, and intact, without peripheral lower extremity edema    NEUROLOGICAL EXAM:     Mental status: Awake, alert and fully oriented, follows commands  Speech and language: Speech is dysarthric but fluent. The patient is able to name and repeat.  Cranial nerve exam: Visual fields are full. There is no nystagmus. Extraocular muscles are intact. R face droop.   Motor exam: There is sustained antigravity with no downward drift in bilateral arms and legs.  Tone is normal. No abnormal movements were seen on exam.  Sensory exam:  Reacts to tactile in all 4 extremities, there is no neglect to double stim  Coordination: No ataxia on bilateral FTN testing      NIHSS: National Institutes of Health Stroke Scale    [0] 1a:Level of Consciousness    0-alert 1-drowsy   2-stupor   3-coma  [0] 1b:LOC Questions                  0-both  1-one      2-neither  [0] 1c:LOC Commands                   0-both  1-one      2-neither  [0] 2: Best Gaze                     0-nl    1-partial  2-forced  [0] 3: Visual " Ramirez                   0-nl    1-partial  2-complete 3-bilat  [2] 4: Facial Paresis                0-nl    1-minor    2-partial  3-full  MOTOR                       0-nl  [0] 5: Right Arm           1-drift  [0] 6: Left Arm             2-some effort vs gravity  [0] 7: Right Leg           3-no effort vs gravity  [0] 8: Left Leg             4-no movement                             x-untestable  [0] 9: Limb Ataxia                    0-abs   1-1_limb   2-2+_limbs       x-untestable  [0] 10:Sensory                        0-nl    1-partial  2-dense  [0] 11:Best Language/Aphasia         0-nl    1-mild/mod 2-severe   3-mute  [1] 12:Dysarthria                     0-nl    1-mild/mod 2-severe       x-untestable  [0] 13:Neglect/Inattention            0-none  1-partial  2-complete  [3] TOTAL      Objective Data:    Labs:  Lab Results   Component Value Date/Time    PROTHROMBTM 13.6 06/15/2022 05:04 PM    INR 1.07 06/15/2022 05:04 PM      Lab Results   Component Value Date/Time    WBC 7.4 06/16/2022 03:12 AM    RBC 5.55 06/16/2022 03:12 AM    HEMOGLOBIN 16.9 06/16/2022 03:12 AM    HEMATOCRIT 46.7 06/16/2022 03:12 AM    MCV 84.1 06/16/2022 03:12 AM    MCH 30.5 06/16/2022 03:12 AM    MCHC 36.2 (H) 06/16/2022 03:12 AM    MPV 12.7 06/16/2022 03:12 AM    NEUTSPOLYS 69.70 06/16/2022 03:12 AM    LYMPHOCYTES 19.10 (L) 06/16/2022 03:12 AM    MONOCYTES 8.70 06/16/2022 03:12 AM    EOSINOPHILS 0.70 06/16/2022 03:12 AM    BASOPHILS 1.10 06/16/2022 03:12 AM      Lab Results   Component Value Date/Time    SODIUM 144 06/16/2022 03:12 AM    POTASSIUM 3.1 (L) 06/16/2022 03:12 AM    CHLORIDE 110 06/16/2022 03:12 AM    CO2 21 06/16/2022 03:12 AM    GLUCOSE 143 (H) 06/16/2022 03:12 AM    BUN 12 06/16/2022 03:12 AM    CREATININE 1.20 06/16/2022 03:12 AM      Lab Results   Component Value Date/Time    CHOLSTRLTOT 176 06/16/2022 03:12 AM     (H) 06/16/2022 03:12 AM    HDL 36 (A) 06/16/2022 03:12 AM    TRIGLYCERIDE 107 06/16/2022 03:12 AM        Lab Results   Component Value Date/Time    ALKPHOSPHAT 90 06/16/2022 03:12 AM    ASTSGOT 36 06/16/2022 03:12 AM    ALTSGPT 47 06/16/2022 03:12 AM    TBILIRUBIN 1.4 06/16/2022 03:12 AM        Imaging/Testing:    I interpreted and/or reviewed the patient's neuroimaging    MR-BRAIN-WITH & W/O   Final Result      1.  Acute left basal ganglia infarct. No abnormal associated enhancement.   2.  Mild diffuse cerebral substance loss.   3.  Mild microangiopathic ischemic change versus demyelination or gliosis.      US-ABDOMEN COMPLETE SURVEY   Final Result      1.  Hepatic steatosis.   2.  Mild splenomegaly.   3.  Questionable debris in the urinary bladder, artifact versus possibly related to infection. Correlate with urinalysis.         DX-CHEST-PORTABLE (1 VIEW)   Final Result      No acute cardiopulmonary abnormality identified.      CT-CTA NECK WITH & W/O-POST PROCESSING   Final Result      CT angiogram of the neck notable for mostly soft plaque and tortuous vessels but no significant stenosis, dissection or occlusion      CT-CEREBRAL PERFUSION ANALYSIS   Final Result      1.  Normal study. Cerebral blood flow less than 30% likely representing completed infarct = 0 mL.      2.  T Max more than 6 seconds likely representing combination of completed infarct and ischemia = 0 mL.      3.  Mismatched volume likely representing ischemic brain/penumbra = None      4.  Please note that the cerebral perfusion was performed on the limited brain tissue around the basal ganglia region. Infarct/ischemia outside the CT perfusion sections can be missed in this study.      CT-CTA HEAD WITH & W/O-POST PROCESS   Final Result      CT angiogram of the [place] within normal limits.      CT-HEAD W/O   Final Result      No acute intracranial abnormality         EC-ECHOCARDIOGRAM COMPLETE W/O CONT    (Results Pending)       Assessment and Plan:  Ion Sharp is a 62 year old man with R side facial weakness, dysarthria, found to have  L basal ganglia acute infarct. Radiographic appearance suggestive of lacunar (small vessel) etiology.  Symptoms may stutter and worsen in upcoming days.  Secondary stroke prevention to include short-term DAPT, high intensity statin, and BP control.    Problem list:  1.  L basal ganglia stroke  2.  Hypertension  3.  Hyperlipidemia    Plan:   - q4h neurochecks ok, symptoms may stutter, please reserve stroke alert for new symptoms only   - continue ASA 81mg daily indefinitely   - continue plavix 75mg daily for a total of 10 days only (end date is 6/25)   - long-term BP goal is 110-130/60-80; start PO anti-HTN and titrate to goal.  Does not require permissive hypertension at this time   - stroke labs:  , HgbA1c 5.5   - continue atorvastatin 80mg daily for goal LDL < 70   - PT/OT/SLP   - may defer TTE and long-term cardiac monitoring, as this is not a cardioembolic stroke   - lovenox 40mg SQ for DVT chemoppx ok   - stroke bridge clinic follow up   - please reconsult Neurology with any further questions or concerns    The evaluation of the patient, and recommended management, was discussed with the attending hospitalist. I have performed a physical exam and reviewed and updated ROS and Plan today (6/16/2022).     Trevor Sam MD  Neurohospitalist, Acute Care Services

## 2022-06-16 NOTE — ED NOTES
Pt transported to MRI. Pt awake and breathing with even, unlabored respirations at time of transfer.

## 2022-06-16 NOTE — ASSESSMENT & PLAN NOTE
Acute left basal ganglia infarct.    -No TPA given   -Telemetry monitoring   - aspirin     -Lipitor 80 mg daily  -Neurology on board   -Neuro check q 4 hourly    -2D ECHO   -Lipid panel, HbA1C   -Maintain LDL less than 75   -DVT prophylaxis  -Fall, aspiration and seizure precautions   -Rehab/ PT Evaluation   -Speech / swallow evaluation

## 2022-06-16 NOTE — ED PROVIDER NOTES
ED Provider Note    CHIEF COMPLAINT  Chief Complaint   Patient presents with   • Possible Stroke     Rt sided facial droop, slurred speech       HPI  HPI     62-year-old male with unremarkable past medical history presents with right facial weakness.  Patient did not notice any issues until 4 PM today.  Patient's wife reports that she might of noticed some weakness present at 2 PM today when she came home from work however unclear last known normal.  Patient was seen normal last night before going to bed but no clear impression of when right-sided facial weakness began.  Patient denies any weakness or numbness in extremities.  Patient denies any headache.  Patient denies any changes in vision or problems eating.  Patient denies any recent trauma.    REVIEW OF SYSTEMS  Review of Systems   Constitutional: Negative.  Negative for fever.   HENT: Negative.  Negative for ear pain and sore throat.    Eyes: Negative.  Negative for pain.   Respiratory: Negative.  Negative for shortness of breath.    Cardiovascular: Negative.  Negative for chest pain.   Gastrointestinal: Negative.  Negative for abdominal pain and blood in stool.   Genitourinary: Negative for dysuria and flank pain.   Musculoskeletal: Negative for back pain, myalgias and neck pain.   Skin: Negative.  Negative for rash.   Neurological: Positive for focal weakness (facial). Negative for seizures, weakness and headaches.   Endo/Heme/Allergies: Does not bruise/bleed easily.   Psychiatric/Behavioral: Negative for hallucinations and suicidal ideas.   All other systems reviewed and are negative.      PAST MEDICAL HISTORY   has a past medical history of Hypertension.    SOCIAL HISTORY  Social History     Tobacco Use   • Smoking status: Never Smoker   • Smokeless tobacco: Never Used   Vaping Use   • Vaping Use: Never used   Substance and Sexual Activity   • Alcohol use: Not Currently   • Drug use: Not Currently   • Sexual activity: Not on file       SURGICAL  "HISTORY  patient denies any surgical history    CURRENT MEDICATIONS  Home Medications     Reviewed by Jake Thomas (Pharmacy Tech) on 06/15/22 at 1805  Med List Status: Complete   Medication Last Dose Status        Patient Flavio Taking any Medications                       ALLERGIES  No Known Allergies    PHYSICAL EXAM  VITAL SIGNS: BP (!) 186/105   Pulse 85   Temp 37.4 °C (99.3 °F) (Temporal)   Resp 18   Ht 1.727 m (5' 8\")   Wt 86.5 kg (190 lb 11.2 oz)   SpO2 94%   BMI 29.00 kg/m²  @RITA[[id number]::@  Pulse ox interpretation: I interpret this pulse ox as normal.    Physical Exam  Vitals and nursing note reviewed.   HENT:      Head: Normocephalic.      Right Ear: External ear normal.      Left Ear: External ear normal.      Mouth/Throat:      Pharynx: No oropharyngeal exudate.   Eyes:      General: No scleral icterus.     Pupils: Pupils are equal, round, and reactive to light.   Cardiovascular:      Rate and Rhythm: Normal rate.   Pulmonary:      Effort: Pulmonary effort is normal. No respiratory distress.   Abdominal:      General: There is no distension.      Tenderness: There is no abdominal tenderness.   Musculoskeletal:         General: No deformity. Normal range of motion.      Cervical back: Normal range of motion.   Skin:     General: Skin is warm and dry.      Findings: No erythema or rash.   Neurological:      Mental Status: He is alert and oriented to person, place, and time.      Coordination: Coordination normal.      Comments: There is slight delay blinking on the right compared to the left.  Left forehead folding skin on right compared to left.  Ability to lift right eyebrow is preserved.  5 out of 5 strength x4.  Sensation intact light touch.  Normal finger-nose-finger.  Normal reflexes bilaterally.  No clonus. EOMI. PERRL.       Psychiatric:         Mood and Affect: Affect normal.         Judgment: Judgment normal.            DIAGNOSTIC STUDIES / PROCEDURES    LABS/EKG  Results for orders " placed or performed during the hospital encounter of 06/15/22   CBC WITH DIFFERENTIAL   Result Value Ref Range    WBC 7.3 4.8 - 10.8 K/uL    RBC 5.73 4.70 - 6.10 M/uL    Hemoglobin 17.9 14.0 - 18.0 g/dL    Hematocrit 48.3 42.0 - 52.0 %    MCV 84.3 81.4 - 97.8 fL    MCH 31.2 27.0 - 33.0 pg    MCHC 37.1 (H) 33.7 - 35.3 g/dL    RDW 38.4 35.9 - 50.0 fL    Platelet Count 75 (L) 164 - 446 K/uL    MPV 12.2 9.0 - 12.9 fL    Neutrophils-Polys 61.70 44.00 - 72.00 %    Lymphocytes 24.40 22.00 - 41.00 %    Monocytes 10.30 0.00 - 13.40 %    Eosinophils 1.80 0.00 - 6.90 %    Basophils 1.40 0.00 - 1.80 %    Immature Granulocytes 0.40 0.00 - 0.90 %    Nucleated RBC 0.00 /100 WBC    Neutrophils (Absolute) 4.52 1.82 - 7.42 K/uL    Lymphs (Absolute) 1.78 1.00 - 4.80 K/uL    Monos (Absolute) 0.75 0.00 - 0.85 K/uL    Eos (Absolute) 0.13 0.00 - 0.51 K/uL    Baso (Absolute) 0.10 0.00 - 0.12 K/uL    Immature Granulocytes (abs) 0.03 0.00 - 0.11 K/uL    NRBC (Absolute) 0.00 K/uL   COMP METABOLIC PANEL   Result Value Ref Range    Sodium 144 135 - 145 mmol/L    Potassium 3.3 (L) 3.6 - 5.5 mmol/L    Chloride 107 96 - 112 mmol/L    Co2 22 20 - 33 mmol/L    Anion Gap 15.0 7.0 - 16.0    Glucose 159 (H) 65 - 99 mg/dL    Bun 12 8 - 22 mg/dL    Creatinine 1.33 0.50 - 1.40 mg/dL    Calcium 9.5 8.5 - 10.5 mg/dL    AST(SGOT) 49 (H) 12 - 45 U/L    ALT(SGPT) 51 (H) 2 - 50 U/L    Alkaline Phosphatase 98 30 - 99 U/L    Total Bilirubin 1.5 0.1 - 1.5 mg/dL    Albumin 4.5 3.2 - 4.9 g/dL    Total Protein 7.5 6.0 - 8.2 g/dL    Globulin 3.0 1.9 - 3.5 g/dL    A-G Ratio 1.5 g/dL   PROTHROMBIN TIME   Result Value Ref Range    PT 13.6 12.0 - 14.6 sec    INR 1.07 0.87 - 1.13   APTT   Result Value Ref Range    APTT 25.6 24.7 - 36.0 sec   COD (ADULT)   Result Value Ref Range    ABO Grouping Only O     Rh Grouping Only POS     Antibody Screen-Cod NEG    TROPONIN   Result Value Ref Range    Troponin T 15 6 - 19 ng/L   ABO Rh Confirm   Result Value Ref Range    ABO Rh  Confirm O POS    ESTIMATED GFR   Result Value Ref Range    GFR (CKD-EPI) 61 >60 mL/min/1.73 m 2   Hemoglobin A1C   Result Value Ref Range    Glycohemoglobin 5.5 4.0 - 5.6 %    Est Avg Glucose 111 mg/dL   Diagnostic alcohol (blood)   Result Value Ref Range    Diagnostic Alcohol <10.1 <10.1 mg/dL   Urine drug screen   Result Value Ref Range    Amphetamines Urine Negative Negative    Barbiturates Negative Negative    Benzodiazepines Negative Negative    Cocaine Metabolite Negative Negative    Methadone Negative Negative    Opiates Negative Negative    Oxycodone Negative Negative    Phencyclidine -Pcp Negative Negative    Propoxyphene Negative Negative    Cannabinoid Metab Negative Negative   EKG (NOW)   Result Value Ref Range    Report       [facility] Emergency Dept.    Test Date:  2022-06-15  Pt Name:    SAUL SANFORD               Department: ER  MRN:        [id number]                      Room:        03  Gender:     Male                         Technician: [id number]  :        1960                   Requested By:TANIA QUINTANA  Order #:    [id number]                    Reading MD:    Measurements  Intervals                                Axis  Rate:       101                          P:          48  RI:         168                          QRS:        -66  QRSD:       100                          T:          67  QT:         396  QTc:        514    Interpretive Statements  SINUS TACHYCARDIA  PROBABLE LEFT ATRIAL ABNORMALITY  LEFT ANTERIOR FASCICULAR BLOCK  EARLY PRECORDIAL R/S TRANSITION  LEFT VENTRICULAR HYPERTROPHY  PROLONGED QT INTERVAL  No previous ECG available for comparison     POCT glucose device results   Result Value Ref Range    POC Glucose, Blood 125 (H) 65 - 99 mg/dL       RADIOLOGY  US-ABDOMEN COMPLETE SURVEY   Final Result      1.  Hepatic steatosis.   2.  Mild splenomegaly.   3.  Questionable debris in the urinary bladder, artifact versus possibly related to infection.  Correlate with urinalysis.         DX-CHEST-PORTABLE (1 VIEW)   Final Result      No acute cardiopulmonary abnormality identified.      CT-CTA NECK WITH & W/O-POST PROCESSING   Final Result      CT angiogram of the neck notable for mostly soft plaque and tortuous vessels but no significant stenosis, dissection or occlusion      CT-CEREBRAL PERFUSION ANALYSIS   Final Result      1.  Normal study. Cerebral blood flow less than 30% likely representing completed infarct = 0 mL.      2.  T Max more than 6 seconds likely representing combination of completed infarct and ischemia = 0 mL.      3.  Mismatched volume likely representing ischemic brain/penumbra = None      4.  Please note that the cerebral perfusion was performed on the limited brain tissue around the basal ganglia region. Infarct/ischemia outside the CT perfusion sections can be missed in this study.      CT-CTA HEAD WITH & W/O-POST PROCESS   Final Result      CT angiogram of the [place] within normal limits.      CT-HEAD W/O   Final Result      No acute intracranial abnormality         MR-BRAIN-WITH & W/O    (Results Pending)   EC-ECHOCARDIOGRAM COMPLETE W/O CONT    (Results Pending)        COURSE & MEDICAL DECISION MAKING  Pertinent Labs & Imaging studies reviewed by me. (See chart for details)  I verified that the patient was wearing a mask and I was wearing appropriate PPE every time I entered the room. The patient's mask was on the patient at all times during my encounter except for a brief view of the oropharynx.     62 y.o. male  p/w acute onset right sided facial weakness.     Differential diagnosis includes but is not limited to:  Patient seen by me at bedside at 5:20 PM  Pt hx and PE concerning for CVA    Patient is not a candidate for alteplase, given sx occurred outside range of alteplase. NIH of 3 at this time.  CT w/ no evidence of ICH or large occlusive thrombus.    Given preserved ability to lift right face specifically right eyebrow  plan for admit and MRI to rule out stroke given atypical presentation for Bell's palsy if no stroke is present.    Plan for medicine admission for further monitoring and medication management and MRI  No e/o hemorrhagic conversion at this time  No reported traumatic etiology         FINAL IMPRESSION  Visit Diagnoses     ICD-10-CM   1. Slurred speech  R47.81   2. Facial weakness  R29.810   3. Elevated blood pressure reading  R03.0              Electronically signed by: Gilbert Dean M.D., 6/16/2022 12:16 AM

## 2022-06-16 NOTE — ED TRIAGE NOTES
Pt bib ems from work.  Chief Complaint   Patient presents with   • Possible Stroke     Rt sided facial droop, slurred speech     Pt presented to work at 1600 and coworkers reported rt sided facial droop and slurred speech. Called wife. She reports he had facial droop at 1400 but because they work opposite schedules she had not seen him since yesterday. So unk LKW.    PTA . Pt hypertensive, has not taken any bp meds >1yr. Pt aox4. CASILLAS.    Neuro and ERP eval complete w/ pt arrival to ED.   CT complete.  Pt to Red 3. Report to Kaylee MEJIAS.

## 2022-06-16 NOTE — ED NOTES
Pt to R 3 from CT. Pt is A&Ox4 and awake in bed. Pt placed on cardiac monitor, pulse ox, and automatic BP. Saturating above 90% on RA, SR in the 90s. Call light within reach and pt updated on POC.

## 2022-06-16 NOTE — THERAPY
"Physical Therapy   Initial Evaluation     Patient Name: Ion Sharp  Age:  62 y.o., Sex:  male  Medical Record #: [id number]  Today's Date: 6/16/2022     Precautions  Precautions: Fall Risk    Assessment  Patient is 62 y.o. male found down at home by his wife and found to have a L basilar CVA presents close to this baseline function. He does have some slowed speech and delayed responses with some minor balance impairments.  Patient able to demo all functional mobility and will have assist as needed from family. No further acute care PT needs. Recommend OP PT for further advanced balance training.     Plan    DC Equipment Recommendations: None  Discharge Recommendations: Recommend outpatient physical therapy services to address higher level deficits       Subjective    \"I'm doing OK\"     Objective       06/16/22 0830   Precautions   Precautions Fall Risk   Vitals   Pulse 76   Patient BP Position Supine   Blood Pressure (!) 157/108   Respiration 18   Pulse Oximetry 94 %   O2 Delivery Device None - Room Air   Pain 0 - 10 Group   Therapist Pain Assessment 0   Prior Living Situation   Prior Services Home-Independent   Housing / Facility 2 Story House   Steps Into Home 1   Steps In Home 12   Bathroom Set up Walk In Shower   Equipment Owned None   Lives with - Patient's Self Care Capacity Spouse;Child Less than 18 Years of Age   Comments works as a  at the [employer]. He has 13 and 15 yo kids who can assist as needed   Prior Level of Functional Mobility   Bed Mobility Independent   Transfer Status Independent   Ambulation Independent   Distance Ambulation (Feet)   (community distances)   Assistive Devices Used None   Stairs Independent   History of Falls   History of Falls Yes   Date of Last Fall   (reason for admit)   Cognition    Cognition / Consciousness X   Speech/ Communication Delayed Responses   Level of Consciousness Alert   Comments pleasant and cooperative slowed speech per wife report   Active ROM " Lower Body    Active ROM Lower Body  WDL   Strength Lower Body   Lower Body Strength  WDL   Sensation Lower Body   Lower Extremity Sensation   WDL   Strength Upper Body   Upper Body Strength  WDL   Coordination Lower Body    Coordination Lower Body  X   Toe Tapping Right Impaired   Toe Tapping Left Impaired   Vision   Vision Comments wears glasses for reading   Balance Assessment   Sitting Balance (Static) Good   Sitting Balance (Dynamic) Fair +   Standing Balance (Static) Fair   Standing Balance (Dynamic) Fair   Weight Shift Sitting Good   Weight Shift Standing Fair   Comments no AD   Gait Analysis   Gait Level Of Assist Standby Assist   Assistive Device None   Distance (Feet) 300   # of Times Distance was Traveled 1   # of Stairs Climbed 6   Level of Assist with Stairs Standby Assist   Functional Mobility   Sit to Stand Supervised   Bed, Chair, Wheelchair Transfer Supervised   How much difficulty does the patient currently have...   Turning over in bed (including adjusting bedclothes, sheets and blankets)? 4   Sitting down on and standing up from a chair with arms (e.g., wheelchair, bedside commode, etc.) 4   Moving from lying on back to sitting on the side of the bed? 4   How much help from another person does the patient currently need...   Moving to and from a bed to a chair (including a wheelchair)? 4   Need to walk in a hospital room? 4   Climbing 3-5 steps with a railing? 4   6 clicks Mobility Score 24   Activity Tolerance   Sitting in Chair 1 hour   Sitting Edge of Bed 5 min   Standing 10 min   Education Group   Education Provided Cerebral Vascular Accident;Gait Training;Role of Physical Therapist;Stair Training   Role of Physical Therapist Patient Response Patient;Acceptance;Explanation;Demonstration;Verbal Demonstration   Gait Training Patient Response Patient;Acceptance;Demonstration;Verbal Demonstration;Action Demonstration   Stair Training Patient Response  Patient;Acceptance;Explanation;Demonstration;Verbal Demonstration;Action Demonstration   CVA Patient Response Patient;Family;Acceptance;Explanation;Demonstration;Verbal Demonstration;Action Demonstration   Anticipated Discharge Equipment and Recommendations   DC Equipment Recommendations None   Discharge Recommendations Recommend outpatient physical therapy services to address higher level deficits     Emily Ch, PT, DPT, GCS

## 2022-06-16 NOTE — ED NOTES
Med rec completed per patient at bedside.  Allergies reviewed with patient. YASMANI.  Patient's preferred pharmacy: [facility] on [address].    Patient denies using any prescription medications.  No vitamins or supplements.  No recent over-the-counter medications.  No outpatient antibiotics in the last 30 days.

## 2022-06-16 NOTE — PROGRESS NOTES
Pt arrived to unit via gurney at 2038. Pt oriented to room, unit, and plan of care. Tele-monitor placed and monitor room notified, SR 78. All questions answered at this time. Call light within reach, fall precautions in place. Wife at bedside.

## 2022-06-16 NOTE — THERAPY
Occupational Therapy   Initial Evaluation     Patient Name: Ion Sharp  Age:  62 y.o., Sex:  male  Medical Record #: [id number]  Today's Date: 6/16/2022     Precautions  Precautions: Fall Risk    Assessment  Patient is 62 y.o. male admitted for slurred speech and R facial droop for stroke w/u, found to have L basal ganglia infarct, CAROL, HTN urgency, hyperglycemia, and possible Bell's palsy. PMHx of HTN. Completed ADLs/txfs with SBA-SPV and functional ambulation w/o AD and SPV; LOB in BR, but able to self correct. Pt reports he is at baseline, but wife reports pt speech, balance, and energy is below baseline; decreased insight into deficits. Reports lives with wife and adult children who can assist PRN. Wife works nights, but can assist during the day. Patient will not be actively followed for occupational therapy services at this time, however may be seen if requested by physician for 1 more visit within 30 days to address any discharge or equipment needs.     Plan    Recommend Occupational Therapy  d/c needs only      DC Equipment Recommendations: Tub / Shower Seat  Discharge Recommendations: Anticipate that the patient will have no further occupational therapy needs after discharge from the hospital (as long as family is available to assist PRN.)      Objective     06/16/22 0855   Prior Living Situation   Prior Services Home-Independent   Housing / Facility 2 Story House  (can stay on first floor if must)   Steps Into Home 1   Steps In Home   (FOS)   Bathroom Set up Walk In Shower   Equipment Owned None   Lives with - Patient's Self Care Capacity Spouse;Adult Children   Comments Reports lives with wife and adult children who can assist PRN. Wife works nights, but can assist during the day.   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)   History of Falls   History of Falls Yes   Date of Last Fall   (reports fall prior to admit)   Precautions   Precautions Fall Risk   Vitals   O2 Delivery Device None - Room Air   Pain 0 - 10 Group   Therapist Pain Assessment Post Activity Pain Same as Prior to Activity;During Activity;Nurse Notified  (no c/o pain)   Cognition    Cognition / Consciousness X   Speech/ Communication Delayed Responses;Dysarthric   Level of Consciousness Alert   Comments pleasant and cooperative; wife reports pt speech, balance, and energy is below baseline, but pt reports he is at baseline- decreased insight   Passive ROM Upper Body   Passive ROM Upper Body WDL   Active ROM Upper Body   Active ROM Upper Body  WDL   Strength Upper Body   Upper Body Strength  WDL   Sensation Upper Body   Upper Extremity Sensation  WDL   Coordination Upper Body   Coordination WDL   Comments opposition and nose to finger; functional FM wfl   Balance Assessment   Sitting Balance (Static) Good   Sitting Balance (Dynamic) Fair +   Standing Balance (Static) Fair   Standing Balance (Dynamic) Fair   Weight Shift Sitting Good   Weight Shift Standing Fair   Comments w/o AD; slight LOB in BR, but able to self correct   Bed Mobility    Scooting Supervised   Comments found and left sitting in chair   ADL Assessment   Grooming Supervision  (open and closing toothpaste; declined to complete)   Lower Body Dressing Supervision   Toileting Supervision   Functional Mobility   Sit to Stand Supervised   Bed, Chair, Wheelchair Transfer Supervised   Toilet Transfers Standby Assist   Transfer Method Stand Step   Mobility w/o AD  in room and hallway   Visual Perception   Visual Perception  WDL   Comments reports no change in vision   Edema / Skin Assessment   Edema / Skin  Not Assessed   Activity Tolerance   Comments no c/o fatigue or HA   Education Group   Education Provided Role of Occupational Therapist;Transfers;Pathology of bedrest;Home Safety   Role of Occupational Therapist Patient Response Patient;Family;Acceptance;Explanation;Verbal Demonstration;Reinforcement Needed   Home Safety Patient Response Patient;Family;Acceptance;Explanation;Verbal Demonstration;Reinforcement Needed   Transfers Patient Response Patient;Family;Acceptance;Explanation;Action Demonstration;Reinforcement Needed   Pathology of Bedrest Patient Response Patient;Family;Acceptance;Explanation;Verbal Demonstration;Reinforcement Needed

## 2022-06-16 NOTE — PROGRESS NOTES
Chief Complaint  Here for Flu #2 and prevnar  Temp 97 0      Active Problems    1  Encounter for immunization (V03 89) (Z23)   2  Infantile eczema (690 12) (L20 83)   3  Need for pneumococcal vaccination (V03 82) (Z23)    Current Meds   1  Multi-Vit/Fluoride 0 25 MG/ML Oral Solution; TAKE 1 DROPPERFUL DAILY; Therapy: [id number] to (Last Rx:[id number])  Requested for: [id number] Ordered    Allergies    1  No Known Drug Allergies    Plan  Encounter for immunization    · Fluzone Quadrivalent 0 25 ML Intramuscular Suspension Prefilled Syringe   · Prevnar 13 Intramuscular Suspension    Future Appointments    Date/Time Provider Specialty Site   02/20/2017 08:00 AM Trina Whyte MD Pediatrics  [facility]   Electronically signed by : Neal Street, ; Dec 16 2016  5:02PM EST                       (Author)    Electronically signed by :  Andrea Garcia MD; Dec 19 2016 10:00AM EST Neuro MD at bedside, endorses current stroke effects expected to worsen before improve (right face droop and slur). If any other stroke symptoms develop (e.g. left side face droop) activate code stroke.

## 2022-06-16 NOTE — CARE PLAN
The patient is Watcher - Medium risk of patient condition declining or worsening    Shift Goals  Clinical Goals: neuro checks  Patient Goals: MRI results    Progress made toward(s) clinical / shift goals:  RN to monitor neuro status, Q4 neuro checks in place. RN discussed stroke signs and symptoms with pt and wife, pt verbalized understanding.     Problem: Knowledge Deficit - Stroke Education  Goal: Patient's knowledge of stroke and risk factors will improve  Outcome: Progressing     Problem: Psychosocial - Patient Condition  Goal: Patient's ability to verbalize feelings about condition will improve  Outcome: Progressing  Note: Pt stated he is feeling much better than before, pt is hopeful for recovery       Patient is not progressing towards the following goals:

## 2022-06-16 NOTE — PROGRESS NOTES
Hospital Medicine Daily Progress Note    Date of Service  6/16/2022    Chief Complaint  Ion Sharp is a 62 y.o. male admitted 6/15/2022 with slurred speech    Hospital Course  Ion Sharp is a 62 y.o. male with history of hypertension who presented 6/15/2022 for stroke evaluation.  Patient reported going to work earlier, at 1600 coworker felt that patient had right facial droop and slurring of speech.  Wife was called and patient was subsequently sent to ER for evaluation.  No acute etiology seen on extensive work-up in ER.  Evaluated by neurology.  Interval Problem Update  6/16/2022  BP remain elevated.  KCl added for hypokalemia  MRI brain noted with acute left basal ganglia infarct  PT/OT/SLP evaluation pending   PMR consulted  Neurology on board    I have discussed this patient's plan of care and discharge plan at IDT rounds today with Case Management, Nursing, Nursing leadership, and other members of the IDT team.    Consultants/Specialty  neurology    Code Status  Full Code    Disposition  Patient is not medically cleared for discharge.   Anticipate discharge to to home with close outpatient follow-up.  I have placed the appropriate orders for post-discharge needs.    Review of Systems  Review of Systems   Constitutional: Negative for fever.   HENT: Negative for hearing loss.    Eyes: Negative for blurred vision.   Respiratory: Negative for cough and shortness of breath.    Cardiovascular: Negative for chest pain.   Gastrointestinal: Negative for nausea and vomiting.   Genitourinary: Negative for dysuria.   Musculoskeletal: Negative for myalgias.   Skin: Negative for rash.   Neurological: Negative for dizziness.   Endo/Heme/Allergies: Does not bruise/bleed easily.        Physical Exam  Temp:  [36.6 °C (97.9 °F)-37.4 °C (99.3 °F)] 37.3 °C (99.1 °F)  Pulse:  [] 76  Resp:  [18-24] 18  BP: (147-207)/() 157/108  SpO2:  [90 %-96 %] 94 %    Physical Exam  Constitutional:       General: He is not in  acute distress.  HENT:      Head: Normocephalic and atraumatic.      Right Ear: Tympanic membrane normal.      Left Ear: Tympanic membrane normal.      Nose: Nose normal.      Mouth/Throat:      Mouth: Mucous membranes are moist.   Eyes:      Extraocular Movements: Extraocular movements intact.      Pupils: Pupils are equal, round, and reactive to light.   Cardiovascular:      Rate and Rhythm: Normal rate and regular rhythm.      Pulses: Normal pulses.   Pulmonary:      Effort: Pulmonary effort is normal. No respiratory distress.   Abdominal:      General: Abdomen is flat. There is no distension.   Musculoskeletal:      Cervical back: Normal range of motion.      Right lower leg: No edema.      Left lower leg: No edema.   Skin:     General: Skin is warm.   Neurological:      General: No focal deficit present.      Mental Status: He is alert and oriented to person, place, and time. Mental status is at baseline.      Comments: Right facial droop.  Sensation intact.  Motor 5 out of 5 all extremities.   Psychiatric:         Mood and Affect: Mood normal.         Fluids  No intake or output data in the 24 hours ending 06/16/22 1124    Laboratory  Recent Labs     06/15/22  1704 06/16/22  0312   WBC 7.3 7.4   RBC 5.73 5.55   HEMOGLOBIN 17.9 16.9   HEMATOCRIT 48.3 46.7   MCV 84.3 84.1   MCH 31.2 30.5   MCHC 37.1* 36.2*   RDW 38.4 39.7   PLATELETCT 75* 72*   MPV 12.2 12.7     Recent Labs     06/15/22  1704 06/16/22  0312   SODIUM 144 144   POTASSIUM 3.3* 3.1*   CHLORIDE 107 110   CO2 22 21   GLUCOSE 159* 143*   BUN 12 12   CREATININE 1.33 1.20   CALCIUM 9.5 8.7     Recent Labs     06/15/22  1704   APTT 25.6   INR 1.07         Recent Labs     06/16/22  0312   TRIGLYCERIDE 107   HDL 36*   *       Imaging  MR-BRAIN-WITH & W/O   Final Result      1.  Acute left basal ganglia infarct. No abnormal associated enhancement.   2.  Mild diffuse cerebral substance loss.   3.  Mild microangiopathic ischemic change versus  demyelination or gliosis.      US-ABDOMEN COMPLETE SURVEY   Final Result      1.  Hepatic steatosis.   2.  Mild splenomegaly.   3.  Questionable debris in the urinary bladder, artifact versus possibly related to infection. Correlate with urinalysis.         DX-CHEST-PORTABLE (1 VIEW)   Final Result      No acute cardiopulmonary abnormality identified.      CT-CTA NECK WITH & W/O-POST PROCESSING   Final Result      CT angiogram of the neck notable for mostly soft plaque and tortuous vessels but no significant stenosis, dissection or occlusion      CT-CEREBRAL PERFUSION ANALYSIS   Final Result      1.  Normal study. Cerebral blood flow less than 30% likely representing completed infarct = 0 mL.      2.  T Max more than 6 seconds likely representing combination of completed infarct and ischemia = 0 mL.      3.  Mismatched volume likely representing ischemic brain/penumbra = None      4.  Please note that the cerebral perfusion was performed on the limited brain tissue around the basal ganglia region. Infarct/ischemia outside the CT perfusion sections can be missed in this study.      CT-CTA HEAD WITH & W/O-POST PROCESS   Final Result      CT angiogram of the [place] within normal limits.      CT-HEAD W/O   Final Result      No acute intracranial abnormality         EC-ECHOCARDIOGRAM COMPLETE W/O CONT    (Results Pending)        Assessment/Plan  * Basal ganglia stroke (HCC)  Assessment & Plan  Acute left basal ganglia infarct.    -No TPA given   -Telemetry monitoring   - aspirin     -Lipitor 80 mg daily  -Neurology on board   -Neuro check q 4 hourly    -2D ECHO   -Lipid panel, HbA1C   -Maintain LDL less than 75   -DVT prophylaxis  -Fall, aspiration and seizure precautions   -Rehab/ PT Evaluation   -Speech / swallow evaluation    QT prolongation  Assessment & Plan  Avoid Qt prolonging medication     CAROL (acute kidney injury) (HCC)  Assessment & Plan  Cr 1.33, unknown baseline  IVF  F/u FeNa, Abd  US    Hypertensive urgency  Assessment & Plan  SBP>200 in ED  Amlodipine, hydralazine  PRN hydrazine    Elevated LFTs  Assessment & Plan  Mild, monitor    Facial droop  Assessment & Plan  Possible Bell's palsy    Hyperglycemia  Assessment & Plan  ISS  F/u HbA1c    Hypokalemia  Assessment & Plan  Replace    Slurred speech- (present on admission)  Assessment & Plan  Acute stroke on MRI       VTE prophylaxis: SCDs/TEDs and Xarelto 10 mg daily as prophylaxis    I have performed a physical exam and reviewed and updated ROS and Plan today (6/16/2022). In review of yesterday's note (6/15/2022), there are no changes except as documented above.

## 2022-06-16 NOTE — DISCHARGE PLANNING
[facility] Transitional Care Coordination    Referral from:  Dr Polk  Insurance Provider on Facesheet: Vee  Potential Rehab Diagnosis: Stroke    Chart review indicates patient may need on going medical management and may have therapy needs to possibly meet inpatient rehab facility criteria with the goal of returning to community.    D/C support: Spouse - will need to verify     Physiatry consultation pended per protocol.     Acute left basal ganglia infarct - physiatry consult pended, awaiting therapy evals when appropriate. TCC will follow.     Thank you for the referral.

## 2022-06-16 NOTE — DISCHARGE PLANNING
Patient ambulated 300ft supervised with no AD, patient does not meet criteria for IPR. TCC will no longer follow, please call with any questions [phone].

## 2022-06-17 ENCOUNTER — APPOINTMENT (OUTPATIENT)
Dept: CARDIOLOGY | Facility: MEDICAL CENTER | Age: 62
End: 2022-06-17
Attending: STUDENT IN AN ORGANIZED HEALTH CARE EDUCATION/TRAINING PROGRAM
Payer: COMMERCIAL

## 2022-06-17 VITALS
HEART RATE: 87 BPM | DIASTOLIC BLOOD PRESSURE: 70 MMHG | TEMPERATURE: 98.1 F | BODY MASS INDEX: 28.9 KG/M2 | SYSTOLIC BLOOD PRESSURE: 117 MMHG | HEIGHT: 68 IN | RESPIRATION RATE: 18 BRPM | WEIGHT: 190.7 LBS | OXYGEN SATURATION: 98 %

## 2022-06-17 LAB
ANION GAP SERPL CALC-SCNC: 13 MMOL/L (ref 7–16)
BUN SERPL-MCNC: 15 MG/DL (ref 8–22)
CALCIUM SERPL-MCNC: 8.7 MG/DL (ref 8.5–10.5)
CHLORIDE SERPL-SCNC: 108 MMOL/L (ref 96–112)
CO2 SERPL-SCNC: 20 MMOL/L (ref 20–33)
CREAT SERPL-MCNC: 1.27 MG/DL (ref 0.5–1.4)
GFR SERPLBLD CREATININE-BSD FMLA CKD-EPI: 64 ML/MIN/1.73 M 2
GLUCOSE BLD STRIP.AUTO-MCNC: 132 MG/DL (ref 65–99)
GLUCOSE BLD STRIP.AUTO-MCNC: 133 MG/DL (ref 65–99)
GLUCOSE SERPL-MCNC: 102 MG/DL (ref 65–99)
LV EJECT FRACT  99904: 65
LV EJECT FRACT MOD 2C 99903: 72.42
LV EJECT FRACT MOD 4C 99902: 50.75
LV EJECT FRACT MOD BP 99901: 61.37
POTASSIUM SERPL-SCNC: 3.2 MMOL/L (ref 3.6–5.5)
SODIUM SERPL-SCNC: 141 MMOL/L (ref 135–145)

## 2022-06-17 PROCEDURE — A9270 NON-COVERED ITEM OR SERVICE: HCPCS | Performed by: STUDENT IN AN ORGANIZED HEALTH CARE EDUCATION/TRAINING PROGRAM

## 2022-06-17 PROCEDURE — 700102 HCHG RX REV CODE 250 W/ 637 OVERRIDE(OP): Performed by: PSYCHIATRY & NEUROLOGY

## 2022-06-17 PROCEDURE — 700102 HCHG RX REV CODE 250 W/ 637 OVERRIDE(OP): Performed by: STUDENT IN AN ORGANIZED HEALTH CARE EDUCATION/TRAINING PROGRAM

## 2022-06-17 PROCEDURE — G0378 HOSPITAL OBSERVATION PER HR: HCPCS

## 2022-06-17 PROCEDURE — 93306 TTE W/DOPPLER COMPLETE: CPT

## 2022-06-17 PROCEDURE — A9270 NON-COVERED ITEM OR SERVICE: HCPCS | Performed by: PSYCHIATRY & NEUROLOGY

## 2022-06-17 PROCEDURE — 80048 BASIC METABOLIC PNL TOTAL CA: CPT

## 2022-06-17 PROCEDURE — 96376 TX/PRO/DX INJ SAME DRUG ADON: CPT

## 2022-06-17 PROCEDURE — 700111 HCHG RX REV CODE 636 W/ 250 OVERRIDE (IP): Performed by: STUDENT IN AN ORGANIZED HEALTH CARE EDUCATION/TRAINING PROGRAM

## 2022-06-17 PROCEDURE — 93306 TTE W/DOPPLER COMPLETE: CPT | Mod: 26 | Performed by: INTERNAL MEDICINE

## 2022-06-17 PROCEDURE — 36415 COLL VENOUS BLD VENIPUNCTURE: CPT

## 2022-06-17 PROCEDURE — 82962 GLUCOSE BLOOD TEST: CPT

## 2022-06-17 RX ORDER — LISINOPRIL 10 MG/1
10 TABLET ORAL DAILY
Qty: 30 TABLET | Refills: 0 | Status: SHIPPED | OUTPATIENT
Start: 2022-06-17 | End: 2022-07-17

## 2022-06-17 RX ORDER — CARVEDILOL 3.12 MG/1
3.12 TABLET ORAL 2 TIMES DAILY WITH MEALS
Qty: 60 TABLET | Refills: 0 | Status: SHIPPED | OUTPATIENT
Start: 2022-06-17 | End: 2022-07-17

## 2022-06-17 RX ORDER — LISINOPRIL 10 MG/1
10 TABLET ORAL
Status: DISCONTINUED | OUTPATIENT
Start: 2022-06-17 | End: 2022-06-17 | Stop reason: HOSPADM

## 2022-06-17 RX ORDER — ASPIRIN 81 MG/1
81 TABLET, CHEWABLE ORAL DAILY
Qty: 30 TABLET | Refills: 0 | Status: SHIPPED | OUTPATIENT
Start: 2022-06-18 | End: 2022-07-18

## 2022-06-17 RX ORDER — CARVEDILOL 3.12 MG/1
3.12 TABLET ORAL 2 TIMES DAILY WITH MEALS
Status: DISCONTINUED | OUTPATIENT
Start: 2022-06-17 | End: 2022-06-17 | Stop reason: HOSPADM

## 2022-06-17 RX ORDER — HYDRALAZINE HYDROCHLORIDE 50 MG/1
50 TABLET, FILM COATED ORAL EVERY 8 HOURS
Qty: 90 TABLET | Refills: 0 | Status: SHIPPED | OUTPATIENT
Start: 2022-06-17 | End: 2022-07-17

## 2022-06-17 RX ORDER — POTASSIUM CHLORIDE 20 MEQ/1
20 TABLET, EXTENDED RELEASE ORAL DAILY
Qty: 3 TABLET | Refills: 0 | Status: SHIPPED | OUTPATIENT
Start: 2022-06-17 | End: 2022-06-20

## 2022-06-17 RX ORDER — HYDRALAZINE HYDROCHLORIDE 20 MG/ML
10 INJECTION INTRAMUSCULAR; INTRAVENOUS ONCE
Status: COMPLETED | OUTPATIENT
Start: 2022-06-17 | End: 2022-06-17

## 2022-06-17 RX ORDER — ATORVASTATIN CALCIUM 80 MG/1
80 TABLET, FILM COATED ORAL EVERY EVENING
Qty: 30 TABLET | Refills: 0 | Status: SHIPPED | OUTPATIENT
Start: 2022-06-17 | End: 2022-07-17

## 2022-06-17 RX ORDER — CLOPIDOGREL BISULFATE 75 MG/1
75 TABLET ORAL DAILY
Qty: 7 TABLET | Refills: 0 | Status: SHIPPED | OUTPATIENT
Start: 2022-06-18 | End: 2022-06-25

## 2022-06-17 RX ORDER — POTASSIUM CHLORIDE 20 MEQ/1
40 TABLET, EXTENDED RELEASE ORAL EVERY 4 HOURS
Status: COMPLETED | OUTPATIENT
Start: 2022-06-17 | End: 2022-06-17

## 2022-06-17 RX ORDER — AMLODIPINE BESYLATE 10 MG/1
10 TABLET ORAL DAILY
Qty: 30 TABLET | Refills: 0 | Status: SHIPPED | OUTPATIENT
Start: 2022-06-18 | End: 2022-07-18

## 2022-06-17 RX ADMIN — LISINOPRIL 10 MG: 10 TABLET ORAL at 09:28

## 2022-06-17 RX ADMIN — CARVEDILOL 3.12 MG: 3.12 TABLET, FILM COATED ORAL at 12:40

## 2022-06-17 RX ADMIN — POTASSIUM CHLORIDE 40 MEQ: 1500 TABLET, EXTENDED RELEASE ORAL at 09:28

## 2022-06-17 RX ADMIN — ASPIRIN 81 MG 81 MG: 81 TABLET ORAL at 05:27

## 2022-06-17 RX ADMIN — CARVEDILOL 3.12 MG: 3.12 TABLET, FILM COATED ORAL at 16:53

## 2022-06-17 RX ADMIN — ATORVASTATIN CALCIUM 80 MG: 80 TABLET, FILM COATED ORAL at 16:53

## 2022-06-17 RX ADMIN — HYDRALAZINE HYDROCHLORIDE 50 MG: 50 TABLET, FILM COATED ORAL at 05:26

## 2022-06-17 RX ADMIN — AMLODIPINE BESYLATE 10 MG: 10 TABLET ORAL at 05:27

## 2022-06-17 RX ADMIN — POTASSIUM CHLORIDE 40 MEQ: 1500 TABLET, EXTENDED RELEASE ORAL at 14:28

## 2022-06-17 RX ADMIN — HYDRALAZINE HYDROCHLORIDE 50 MG: 50 TABLET, FILM COATED ORAL at 14:28

## 2022-06-17 RX ADMIN — HYDRALAZINE HYDROCHLORIDE 10 MG: 20 INJECTION INTRAMUSCULAR; INTRAVENOUS at 09:28

## 2022-06-17 RX ADMIN — CLOPIDOGREL BISULFATE 75 MG: 75 TABLET ORAL at 05:27

## 2022-06-17 NOTE — PROGRESS NOTES
Assumed care of patient, bedside report received from Shorty MEJIAS. Updated on POC, call light within reach and fall precautions in place. Bed locked and in lowest position. Patient instructed to call for assistance before getting out of bed. All questions answered, no other needs at this time.

## 2022-06-17 NOTE — DISCHARGE INSTRUCTIONS
Discharge Instructions    Discharged to home by car with relative. Discharged via wheelchair, hospital escort: Yes.  Special equipment needed: Not Applicable    Be sure to schedule a follow-up appointment with your primary care doctor or any specialists as instructed.     Discharge Plan:        I understand that a diet low in cholesterol, fat, and sodium is recommended for good health. Unless I have been given specific instructions below for another diet, I accept this instruction as my diet prescription.   Other diet: heart healthy    Special Instructions: none    Is patient discharged on Warfarin / Coumadin?   No     Depression / Suicide Risk    As you are discharged from this [facility] facility, it is important to learn how to keep safe from harming yourself.    Recognize the warning signs:  Abrupt changes in personality, positive or negative- including increase in energy   Giving away possessions  Change in eating patterns- significant weight changes-  positive or negative  Change in sleeping patterns- unable to sleep or sleeping all the time   Unwillingness or inability to communicate  Depression  Unusual sadness, discouragement and loneliness  Talk of wanting to die  Neglect of personal appearance   Rebelliousness- reckless behavior  Withdrawal from people/activities they love  Confusion- inability to concentrate     If you or a loved one observes any of these behaviors or has concerns about self-harm, here's what you can do:  Talk about it- your feelings and reasons for harming yourself  Remove any means that you might use to hurt yourself (examples: pills, rope, extension cords, firearm)  Get professional help from the community (Mental Health, Substance Abuse, psychological counseling)  Do not be alone:Call your Safe Contact- someone whom you trust who will be there for you.  Call your local CRISIS HOTLINE 856-5290 or [phone]  Call your local Children's Mobile Crisis Response Team [facility]  [phone] or www.MedNews.ReversingLabs  Call the toll free National Suicide Prevention Hotlines   National Suicide Prevention Lifeline 063-831-WEHX (6520)  National [facility] 800-SUICIDE (232-0510)            Accidente cerebrovascular isquémico  Ischemic Stroke    Un ictus isquémico (accidente cerebrovascular o ACV) es la muerte repentina de tejido cerebral que ocurre cuando el oxígeno no llega a jason jeovany del cerebro. Es jason emergencia médica que debe tratarse de inmediato. Un ictus isquémico puede causar jason pérdida permanente de la actividad cerebral. [place] puede causar problemas con el funcionamiento de diferentes partes del cuerpo.  ¿Cuáles son las causas?  Esta afección es causada por la falta de oxígeno en jason jeovany del cerebro, que puede ser el resultado de lo siguiente:  Un pequeño coágulo sanguíneo (émbolos) o jason acumulación de placas en los vasos sanguíneos (ateroesclerosis) que bloqueen el torrente sanguíneo en el cerebro.  Ritmo cardíaco anormal (fibrilación auricular).  Jason arteria obstruida o dañada en la billy o el allison.  En ocasiones, se desconoce la causa del accidente cerebrovascular criptogénico).  ¿Qué incrementa el riesgo?  Hay ciertos factores que pueden hacer que sea más propenso a sufrir esta afección. Algunos de estos factores los puede controlar, ester por ejemplo:  Obesidad.  Fumar cigarrillos.  Joann anticonceptivos por vía oral, en especial si consume tabaco.  Falta de actividad física.  Consumo excesivo de bebidas alcohólicas.  Consumo de drogas ilegales, especialmente cocaína y metanfetamina.  Otros factores de riesgo son los siguientes:  Presión arterial elevada (hipertensión arterial).  Colesterol alto.  Diabetes mellitus.  Enfermedades cardíacas.  Ser afroamericano, norteamericano nativo, hispano o nativo de [place].  Ser mayor de 60 años.  Antecedentes familiares de accidente cerebrovascular.  Antecedentes de coágulos sanguíneos, accidente cerebrovascular o ataques isquémicos  transitorios (AIT).  Anemia drepanocítica.  Ser harvinder con antecedentes de preeclampsia.  Cefalea migrañosa.  Apnea del sueño.  Ritmo cardíaco irregular, ester fibrilación auricular.  Enfermedades inflamatorias crónicas, ester artritis reumatoide o lupus.  Trastornos de coagulación (estado hipercoagulable).  ¿Cuáles son los signos o síntomas?  Los síntomas de esta afección, por lo general, aparecen de forma repentina, o puede notarlos después de despertarse. Entre los síntomas repentinos se pueden incluir los siguientes:  Debilidad o adormecimiento de la lanette, el brazo o la pierna, especialmente en un lado del cuerpo.  Dificultad para caminar, o para  los brazos o las piernas.  Pérdida del equilibrio o de la coordinación.  Confusión.  Habla arrastrada (disartria).  Dificultad para hablar o comprender el lenguaje, o ambas (afasia).  Cambios en la visión (ester visión doble, visión borrosa o pérdida de la visión) en jose alejandro o ambos ojos.  Mareos.  Náuseas y vómitos.  Dolor de billy intenso sin causa aparente. Dolor de billy que generalmente se describe ester el peor dolor de billy que haya sufrido.  En lo posible, tome nota de la hora exacta en la que se sintió normal por última vez y de la hora en que comenzaron los síntomas. Infórmele a mcclelland médico.  Si los síntomas van y vienen, esto podría ser un signo de accidente isquémico transitorio de advertencia o AIT. [place] ayuda de inmediato, incluso si se siente mejor.  ¿Cómo se diagnostica?  Esta afección se puede diagnosticar en función de lo siguiente:  Los síntomas, rasheed antecedentes médicos y un examen físico.  Exploración por tomografía computarizada (TC) del cerebro.  Resonancia magnética (RM).  Angiografía por tomografía computarizada (TC). En esta prueba, se utiliza jason computadora para tomarle radiografías de las arterias. Pueden inyectarle un colorante en la marilee para observar el interior de los vasos sanguíneos con más claridad.  Angiografía por resonancia  magnética (RM). Se trata de un tipo de resonancia magnética que se usa para estudiar los vasos sanguíneos.  Angiografía cerebral. En eduar estudio se utilizan mark X y un colorante para observar los vasos sanguíneos del cerebro y el allison.  Tyler vez deba consultar a un médico especialista en accidente cerebrovascular. Puede consultar a un especialista en persona, por teléfono o mediante tecnología a distancia (telemedicina).  Se pueden realizar otros estudios para hallar la causa del accidente cerebrovascular, que pueden incluir los siguientes:  Electrocardiograma (ECG).  Monitorización electrocardiográfica continua.  Ecocardiograma.  Ecocardiograma transesofágico (ETE).  Ecografía de la carótida.  Estudio de la circulación cerebral.  Análisis de marilee.  Estudio del sueño para verificar si tiene apnea del sueño.  ¿Cómo se trata?  El tratamiento de esta afección dependerá de la duración, la gravedad y la causa de los síntomas, y de la jeovany del cerebro afectada. Es muy importante recibir tratamiento tras aparecer los primeros síntomas del accidente cerebrovascular. Algunos tratamientos resultan más eficaces si se realizan en el plazo de las 3 a 6 horas del comienzo de los síntomas del accidente cerebrovascular. Estos tratamientos pueden incluir los siguientes:  Aspirina.  Medicamentos para controlar la presión arterial.  Un medicamento inyectable para disolver el coágulo sanguíneo (trombolítico).  Tratamientos aplicados directamente en la arteria afectada para eliminar o disolver el coágulo sanguíneo.  Otras opciones de tratamiento son las siguientes:  Oxígeno.  Líquidos por vía intravenosa.  Medicamentos para diluir la marilee (anticoagulantes o inhibidores plaquetarios).  Procedimientos para aumentar el flujo sanguíneo.  La administración de medicamentos y los cambios en la dieta pueden ayudar a tratar y controlar los factores de riesgo de accidente cerebrovascular, ester la diabetes, el colesterol alto y la  hipertensión arterial.  Después de un accidente cerebrovascular, puede trabajar con fisioterapeutas, fonoaudiólogos o terapeutas ocupacionales para ayudarlo a recuperarse.  Siga estas indicaciones en mcclelland casa:  Medicamentos  [place] los medicamentos de venta danitza y los recetados solamente ester se lo haya indicado el médico.  Si le indicaron que tomara medicamentos para diluir la marilee, ester aspirinas o anticoagulantes, tómelos exactamente ester se lo haya indicado el médico.  El exceso de anticoagulantes puede causar hemorragias.  Si no mendel la cantidad suficiente, no contará con la protección que necesita contra otro accidente cerebrovascular y demás problemas.  Conozca los efectos secundarios de ramirez anticoagulantes. Al ramirez eduar tipo de medicamentos, asegúrese de lo siguiente:  Ejercer presión sobre las heridas por más tiempo que lo habitual.  Informe a mcclelland dentista y otros médicos que mendel anticoagulantes antes de que le realicen alguna intervención quirúrgica que pueda causar hemorragias.  Evite realizar actividades que puedan causarle traumatismos o lesiones.  Comida y bebida  Siga las indicaciones del médico acerca de la dieta.  Consuma alimentos saludables.  Si el accidente cerebrovascular afectó m[place]and capacidad para tragar, es posible que necesite ramirez medidas para no ahogarse, ester las siguientes:  [place] de a porciones pequeñas.  [place] comidas blandas o hechas puré.  Seguridad  Siga las indicaciones del equipo médico con respecto a la actividad física.  Use un andador o un bastón ester se lo haya indicado el médico.  [place] medidas para crear un entorno seguro en mcclelland casa a fin de reducir el riesgo de caídas. [place] puede incluir lo siguiente:  Hacer que profesionales inspeccionen mcclelland casa.  Colocar barras para sostén en la habitación y el baño.  Colocar inodoros elevados y un asiento en la ducha ester medidas de seguridad.  Instrucciones generales  No consuma ningún producto que contenga tabaco, lo que incluye  "cigarrillos, tabaco de mascar y cigarrillos electrónicos. Si necesita ayuda para dejar de fumar, consulte al médico.  Limite el consumo de alcohol a no más de 1 medida por día si es harvinder y no está embarazada, y 2 medidas por día si es hombre. Lalita medida equivale a 12 oz de cerveza, 5 oz de vino o 1½ oz de bebidas alcohólicas de roman graduación.  Si necesita ayuda para dejar de consumir drogas o alcohol, pídale al médico que le recomiende un programa o que lo derive a un especialista.  Mantenga un estilo de soniya activo y saludable. [place] actividad física habitualmente ester se lo haya indicado el médico.  Acuda a todas las consultas de control ester se lo haya indicado el médico, incluidas las consultas con todos los especialistas de mcclelland equipo médico. [place] es importante.  ¿Cómo se marybeth?  El riesgo de sufrir otro accidente cerebrovascular puede disminuir al tratar, de manera adecuada, la hipertensión arterial, el colesterol alto, la diabetes, las enfermedades cardíacas, la apnea del sueño y la obesidad. También puede disminuir si argelia de fumar, limita el consumo de alcohol y se mantiene físicamente activo.  El médico trabajará con usted para ramirez medidas a fin de evitar las complicaciones del accidente cerebrovascular a corto y a perri plazo.  Solicite ayuda de inmediato si:    Tiene síntomas de un accidente cerebrovascular. \"BE FAST\" es lalita manera fácil de recordar las principales señales de advertencia de un accidente cerebrovascular:  B - Balance (equilibrio). Los signos son dificultad repentina para caminar o pérdida del equilibrio.  E - Eyes (ojos). Los signos son dificultad para jessica o un cambio repentino en la visión.  F - Face (ashkan). Los signos son debilidad repentina o entumecimiento del ashkan, o el ashkan o el párpado se caen hacia un lado.  A - Arms (brazos). Los signos son debilidad o entumecimiento en un brazo. [place] sucede de repente y generalmente en un lado del cuerpo.  S - Speech (habla). Los signos " son dificultad para hablar, hablar arrastrando las palabras o dificultad para comprender lo que la gente dice.  T - Time (tiempo). Es tiempo de llamar a los servicios de emergencias. [place] la hora en la que comenzaron los síntomas.  Presenta otros signos de accidente cerebrovascular, ester los siguientes:  Dolor de billy súbito e intenso que no tiene causa aparente.  Náuseas o vómitos.  Convulsiones.  Estos síntomas pueden representar un problema grave que constituye jason emergencia. No espere hasta que los síntomas desaparezcan. Solicite atención médica de inmediato. Comuníquese con el servicio de emergencias de [place] localidad (911 en los [place]). No conduzca por rasheed propios medios hasta el hospital.  Resumen  Un ictus isquémico (accidente cerebrovascular o ACV) es la muerte repentina de tejido cerebral que ocurre cuando el oxígeno no llega a jason jeovany del cerebro.  Los síntomas de esta afección, por lo general, aparecen de forma repentina, o puede notarlos después de despertarse.  Es muy importante recibir tratamiento tras aparecer los primeros síntomas del accidente cerebrovascular. El accidente cerebrovascular es jason emergencia médica que debe tratarse de inmediato.  Esta información no tiene ester fin reemplazar el consejo del médico. Asegúrese de hacerle al médico cualquier pregunta que tenga.  Document Released: 09/27/2006 Document Revised: 10/16/2018 Document Reviewed: 03/15/2017  Elsevier Patient Education © 2020 [employer] Inc[employer]

## 2022-06-17 NOTE — FACE TO FACE
Face to Face Note  -  Durable Medical Equipment    Prashant Bull M.D. - NPI: [id number]  I certify that this patient is under my care and that they had a durable medical equipment(DME)face to face encounter by the clinical nurse specialist working collaboratively with me that meets the physician DME face-to-face encounter requirements with this patient on:    Date of encounter:   Patient:                    MRN:                       YOB: 2021  Ion Sharp  [id number]  1960     The encounter with the patient was in whole, or in part, for the following medical condition, which is the primary reason for durable medical equipment:  Other - stroke     I certify that, based on my findings, the following durable medical equipment is medically necessary:  Other DME Equipment - tub/ shower seat .        My Clinical findings support the need for the above equipment due to:  Abnormal Gait    If patient feels more short of breath, they can go up to 6 liters per minute and contact healthcare provider.

## 2022-06-17 NOTE — PROGRESS NOTES
Assumed care of pt. Bedside report received from RN. Pt was updated on plan of care. AOx4. Pt pain level 0/10. Call light, phone and personal belongings in reach. Bed alarm on and working properly, bed in lowest position, and locked.

## 2022-06-17 NOTE — CARE PLAN
The patient is Watcher - Medium risk of patient condition declining or worsening    Shift Goals  Clinical Goals: neuro checks  Patient Goals: Rest    Progress made toward(s) clinical / shift goals:  Q4 neur checks in place. RN to monitor for new symptoms.     Problem: Neuro Status  Goal: Neuro status will remain stable or improve  Outcome: Progressing  Note: Patient's neuro status has remained stable, pt is A/O x4 with right facial droop     Problem: Hemodynamic Monitoring  Goal: Patient's hemodynamics, fluid balance and neurologic status will be stable or improve  Outcome: Progressing       Patient is not progressing towards the following goals:

## 2022-06-18 NOTE — PROGRESS NOTES
Pt discharged home. PIV removed, pt stable. Discharge education provided to pt and family member. All questions answered. Pt escorted out with family member, pt refused wheelchair and staff members escorting him out.

## 2022-07-06 ENCOUNTER — APPOINTMENT (OUTPATIENT)
Dept: NEUROLOGY | Facility: MEDICAL CENTER | Age: 62
End: 2022-07-06
Attending: PSYCHIATRY & NEUROLOGY
Payer: COMMERCIAL

## 2022-08-22 ENCOUNTER — APPOINTMENT (OUTPATIENT)
Dept: NEUROLOGY | Facility: MEDICAL CENTER | Age: 62
End: 2022-08-22
Attending: PSYCHIATRY & NEUROLOGY
Payer: COMMERCIAL